# Patient Record
Sex: MALE | Race: WHITE | NOT HISPANIC OR LATINO | Employment: FULL TIME | URBAN - METROPOLITAN AREA
[De-identification: names, ages, dates, MRNs, and addresses within clinical notes are randomized per-mention and may not be internally consistent; named-entity substitution may affect disease eponyms.]

---

## 2019-01-17 ENCOUNTER — HOSPITAL ENCOUNTER (EMERGENCY)
Facility: HOSPITAL | Age: 34
Discharge: HOME | End: 2019-01-18
Attending: EMERGENCY MEDICINE | Admitting: EMERGENCY MEDICINE

## 2019-01-17 DIAGNOSIS — R00.2 PALPITATION: Primary | ICD-10-CM

## 2019-01-17 LAB
ALBUMIN SERPL-MCNC: 4.42 G/DL (ref 3.2–4.8)
ALBUMIN/GLOB SERPL: 1.5 G/DL (ref 1.5–2.5)
ALP SERPL-CCNC: 83 U/L (ref 25–100)
ALT SERPL W P-5'-P-CCNC: 46 U/L (ref 7–40)
ANION GAP SERPL CALCULATED.3IONS-SCNC: 10 MMOL/L (ref 3–11)
AST SERPL-CCNC: 29 U/L (ref 0–33)
BASOPHILS # BLD AUTO: 0.02 10*3/MM3 (ref 0–0.2)
BASOPHILS NFR BLD AUTO: 0.2 % (ref 0–1)
BILIRUB SERPL-MCNC: 1 MG/DL (ref 0.3–1.2)
BUN BLD-MCNC: 14 MG/DL (ref 9–23)
BUN/CREAT SERPL: 16.1 (ref 7–25)
CALCIUM SPEC-SCNC: 9 MG/DL (ref 8.7–10.4)
CHLORIDE SERPL-SCNC: 105 MMOL/L (ref 99–109)
CO2 SERPL-SCNC: 26 MMOL/L (ref 20–31)
CREAT BLD-MCNC: 0.87 MG/DL (ref 0.6–1.3)
DEPRECATED RDW RBC AUTO: 41.3 FL (ref 37–54)
EOSINOPHIL # BLD AUTO: 0.14 10*3/MM3 (ref 0–0.3)
EOSINOPHIL NFR BLD AUTO: 1.7 % (ref 0–3)
ERYTHROCYTE [DISTWIDTH] IN BLOOD BY AUTOMATED COUNT: 14 % (ref 11.3–14.5)
GFR SERPL CREATININE-BSD FRML MDRD: 100 ML/MIN/1.73
GLOBULIN UR ELPH-MCNC: 2.9 GM/DL
GLUCOSE BLD-MCNC: 87 MG/DL (ref 70–100)
HCT VFR BLD AUTO: 44.4 % (ref 38.9–50.9)
HGB BLD-MCNC: 15.1 G/DL (ref 13.1–17.5)
HOLD SPECIMEN: NORMAL
HOLD SPECIMEN: NORMAL
IMM GRANULOCYTES # BLD AUTO: 0.04 10*3/MM3 (ref 0–0.03)
IMM GRANULOCYTES NFR BLD AUTO: 0.5 % (ref 0–0.6)
LYMPHOCYTES # BLD AUTO: 2.6 10*3/MM3 (ref 0.6–4.8)
LYMPHOCYTES NFR BLD AUTO: 31.9 % (ref 24–44)
MCH RBC QN AUTO: 27.5 PG (ref 27–31)
MCHC RBC AUTO-ENTMCNC: 34 G/DL (ref 32–36)
MCV RBC AUTO: 80.9 FL (ref 80–99)
MONOCYTES # BLD AUTO: 0.52 10*3/MM3 (ref 0–1)
MONOCYTES NFR BLD AUTO: 6.4 % (ref 0–12)
NEUTROPHILS # BLD AUTO: 4.84 10*3/MM3 (ref 1.5–8.3)
NEUTROPHILS NFR BLD AUTO: 59.3 % (ref 41–71)
PLATELET # BLD AUTO: 236 10*3/MM3 (ref 150–450)
PMV BLD AUTO: 9 FL (ref 6–12)
POTASSIUM BLD-SCNC: 3.8 MMOL/L (ref 3.5–5.5)
PROT SERPL-MCNC: 7.3 G/DL (ref 5.7–8.2)
RBC # BLD AUTO: 5.49 10*6/MM3 (ref 4.2–5.76)
SODIUM BLD-SCNC: 141 MMOL/L (ref 132–146)
WBC NRBC COR # BLD: 8.16 10*3/MM3 (ref 3.5–10.8)
WHOLE BLOOD HOLD SPECIMEN: NORMAL
WHOLE BLOOD HOLD SPECIMEN: NORMAL

## 2019-01-17 PROCEDURE — 93005 ELECTROCARDIOGRAM TRACING: CPT | Performed by: EMERGENCY MEDICINE

## 2019-01-17 PROCEDURE — 99284 EMERGENCY DEPT VISIT MOD MDM: CPT

## 2019-01-17 PROCEDURE — 85025 COMPLETE CBC W/AUTO DIFF WBC: CPT | Performed by: EMERGENCY MEDICINE

## 2019-01-17 PROCEDURE — 93005 ELECTROCARDIOGRAM TRACING: CPT

## 2019-01-17 PROCEDURE — 80053 COMPREHEN METABOLIC PANEL: CPT | Performed by: EMERGENCY MEDICINE

## 2019-01-17 RX ORDER — SODIUM CHLORIDE 0.9 % (FLUSH) 0.9 %
10 SYRINGE (ML) INJECTION AS NEEDED
Status: DISCONTINUED | OUTPATIENT
Start: 2019-01-17 | End: 2019-01-18 | Stop reason: HOSPADM

## 2019-01-18 ENCOUNTER — OFFICE VISIT (OUTPATIENT)
Dept: CARDIOLOGY | Facility: HOSPITAL | Age: 34
End: 2019-01-18

## 2019-01-18 ENCOUNTER — HOSPITAL ENCOUNTER (OUTPATIENT)
Dept: CARDIOLOGY | Facility: HOSPITAL | Age: 34
Discharge: HOME | End: 2019-01-18
Admitting: NURSE PRACTITIONER

## 2019-01-18 VITALS
HEART RATE: 80 BPM | SYSTOLIC BLOOD PRESSURE: 151 MMHG | WEIGHT: 303 LBS | TEMPERATURE: 98.6 F | RESPIRATION RATE: 16 BRPM | HEIGHT: 71 IN | DIASTOLIC BLOOD PRESSURE: 91 MMHG | BODY MASS INDEX: 42.42 KG/M2 | OXYGEN SATURATION: 96 %

## 2019-01-18 VITALS
BODY MASS INDEX: 42.14 KG/M2 | HEART RATE: 85 BPM | SYSTOLIC BLOOD PRESSURE: 133 MMHG | HEIGHT: 71 IN | WEIGHT: 301 LBS | TEMPERATURE: 98.5 F | DIASTOLIC BLOOD PRESSURE: 70 MMHG | RESPIRATION RATE: 16 BRPM | OXYGEN SATURATION: 98 %

## 2019-01-18 DIAGNOSIS — R06.83 SNORING: ICD-10-CM

## 2019-01-18 DIAGNOSIS — R00.2 PALPITATIONS: Primary | ICD-10-CM

## 2019-01-18 DIAGNOSIS — R42 DIZZINESS: ICD-10-CM

## 2019-01-18 DIAGNOSIS — R53.83 FATIGUE, UNSPECIFIED TYPE: ICD-10-CM

## 2019-01-18 DIAGNOSIS — I49.1 PAC (PREMATURE ATRIAL CONTRACTION): ICD-10-CM

## 2019-01-18 DIAGNOSIS — R00.2 PALPITATIONS: ICD-10-CM

## 2019-01-18 PROCEDURE — 99214 OFFICE O/P EST MOD 30 MIN: CPT | Performed by: NURSE PRACTITIONER

## 2019-01-18 PROCEDURE — 93225 XTRNL ECG REC<48 HRS REC: CPT

## 2019-01-18 NOTE — DISCHARGE INSTRUCTIONS
Follow-up with Dr. Mian Lopez, cardiology for Holter monitor placement as soon as possible.     Return to the Emergency Department if new or worsening symptoms occur.

## 2019-01-18 NOTE — ED PROVIDER NOTES
[facility] EMERGENCY DEPARTMENT    eMERGENCY dEPARTMENT eNCOUnter      Pt Name: Grzegorz Gutierrez  MRN: [id number]  YOB: 1985  Date of evaluation: 1/17/2019  Provider: Andres Villar DO    CHIEF COMPLAINT       Chief Complaint   Patient presents with   • Rapid Heart Rate         HISTORY OF PRESENT ILLNESS  (Location/Symptom, Timing/Onset, Context/Setting, Quality, Duration, Modifying Factors, Severity.)   Grzegorz Gutierrez is a 33 y.o. male who presents to the emergency department With complaints of palpitations. Patient states that this onset 1/12 and he describes it as an intermittent “extra beat.” He notes that it worsens with rest but he does not detect it while working. His associated symptoms include chest pain which is described as tightness but he denies any nausea, vomiting, fever or chills. He reports that 1 week ago he visited an urgent treatment clinic for cough and congestion. He was prescribed Prednisone and Sudafed but stopped both medications on 1/13 after his symptoms arose.  Patient denies a history of smoking or heart issues. He notes that he does drink caffeine regularly but denies an excessive amount. He mentions he has had no recent travel.       Nursing notes were reviewed.    REVIEW OF SYSTEMS    (2-9 systems for level 4, 10 or more for level 5)   ROS:  General:  No fevers, no chills, no weakness  Cardiovascular:  + chest pain, + palpitations  Respiratory:  No shortness of breath, no cough, no wheezing  Gastrointestinal:  No pain, no nausea, no vomiting, no diarrhea  Musculoskeletal:  No muscle pain, no joint pain  Skin:  No rash, no easy bruising  Neurologic:  No speech problems, no headache, no extremity numbness, no extremity tingling, no extremity weakness  Psychiatric:  No anxiety  Genitourinary:  No dysuria, no hematuria    Except as noted above the remainder of the review of systems was reviewed and negative.       PAST MEDICAL HISTORY   History  reviewed. No pertinent past medical history.      SURGICAL HISTORY     History reviewed. No pertinent surgical history.      CURRENT MEDICATIONS     No current facility-administered medications for this encounter.     Current Outpatient Medications:   •  amoxicillin-clavulanate (AUGMENTIN) 250-125 MG per tablet, Take 1 tablet by mouth 3 (Three) Times a Day., Disp: , Rfl:     ALLERGIES     Patient has no known allergies.    FAMILY HISTORY       Family History   Problem Relation Age of Onset   • Hypertension Mother    • Hypertension Maternal Grandmother    • Transient ischemic attack Maternal Grandmother    • Heart attack Paternal Grandmother    • No Known Problems Father    • No Known Problems Sister    • No Known Problems Brother           SOCIAL HISTORY       Social History     Socioeconomic History   • Marital status:      Spouse name: Not on file   • Number of children: Not on file   • Years of education: Not on file   • Highest education level: Not on file   Tobacco Use   • Smoking status: Never Smoker   • Smokeless tobacco: Never Used   Substance and Sexual Activity   • Alcohol use: Yes     Frequency: Never     Comment: rare   • Drug use: No   • Sexual activity: Defer   Social History Narrative    Caffeine:0-1  serving per day. None this week.         PHYSICAL EXAM    (up to 7 for level 4, 8 or more for level 5)     Vitals:    01/17/19 2200 01/17/19 2230 01/17/19 2300 01/18/19 0000   BP: 146/77 128/83 136/82 151/91   Pulse: 71 73 67 80   Resp:       Temp:       SpO2: 96% 92% 94% 96%   Weight:       Height:           Physical Exam  General :Patient is awake, alert, oriented, in no acute distress, nontoxic appearing  HEENT: Pupils are equally round and reactive to light, EOMI, conjunctivae clear, sclerae white, there is no injection no icterus.  Oral mucosa is moist, no exudate. Uvula is midline  Neck: Neck is supple, full range of motion, trachea midline  Cardiac: Heart regular rate, rhythm, no murmurs,  rubs, or gallops  Lungs: Lungs are clear to auscultation, there is no wheezing, rhonchi, or rales. There is no use of accessory muscles.  Chest wall: There is no tenderness to palpation over the chest wall or over ribs  Abdomen: Abdomen is soft, nontender, nondistended. There is no firm or pulsatile masses, no rebound rigidity or guarding.   Musculoskeletal: 5 out of 5 strength in all 4 extremities.  No focal muscle deficits are appreciated  Neuro: Motor intact, sensory intact, level of consciousness is normal, cerebellar function is normal, reflexes are grossly normal. No evidence of incontinence or loss of bowel or bladder function, no saddle anesthesia noted   Dermatology: Skin is warm and dry  Psych: Mentation is grossly normal, cognition is grossly normal. Affect is appropriate.      DIAGNOSTIC RESULTS     EKG: All EKG's are interpreted by the Emergency Department Physician who either signs or Co-signs this chart in the absence of a cardiologist.    ECG 12 Lead   Final Result   Test Reason : high hr   Blood Pressure : **/** mmHG   Vent. Rate : 082 BPM     Atrial Rate : 082 BPM      P-R Int : 140 ms          QRS Dur : 090 ms       QT Int : 370 ms       P-R-T Axes : 066 012 035 degrees      QTc Int : 432 ms      Sinus rhythm with premature supraventricular complexes   Otherwise normal ECG   No previous ECGs available   Confirmed by ZULEYKA JOVEL MD ([id number]) on 1/17/2019 9:03:56 PM      Referred By:  EDMD           Confirmed By:ZULEYKA JOVEL MD      ECG 12 Lead    (Results Pending)       RADIOLOGY:   Non-plain film images such as CT, Ultrasound and MRI are read by the radiologist. Plain radiographic images are visualized and preliminarily interpreted by the emergency physician with the below findings:      [] Radiologist's Report Reviewed:  No orders to display         ED BEDSIDE ULTRASOUND:   Performed by ED Physician - none    LABS:    I have reviewed and interpreted all of the currently available lab results  from this visit (if applicable):  Results for orders placed or performed during the hospital encounter of 01/17/19   Comprehensive Metabolic Panel   Result Value Ref Range    Glucose 87 70 - 100 mg/dL    BUN 14 9 - 23 mg/dL    Creatinine 0.87 0.60 - 1.30 mg/dL    Sodium 141 132 - 146 mmol/L    Potassium 3.8 3.5 - 5.5 mmol/L    Chloride 105 99 - 109 mmol/L    CO2 26.0 20.0 - 31.0 mmol/L    Calcium 9.0 8.7 - 10.4 mg/dL    Total Protein 7.3 5.7 - 8.2 g/dL    Albumin 4.42 3.20 - 4.80 g/dL    ALT (SGPT) 46 (H) 7 - 40 U/L    AST (SGOT) 29 0 - 33 U/L    Alkaline Phosphatase 83 25 - 100 U/L    Total Bilirubin 1.0 0.3 - 1.2 mg/dL    eGFR Non African Amer 100 >60 mL/min/1.73    Globulin 2.9 gm/dL    A/G Ratio 1.5 1.5 - 2.5 g/dL    BUN/Creatinine Ratio 16.1 7.0 - 25.0    Anion Gap 10.0 3.0 - 11.0 mmol/L   CBC Auto Differential   Result Value Ref Range    WBC 8.16 3.50 - 10.80 10*3/mm3    RBC 5.49 4.20 - 5.76 10*6/mm3    Hemoglobin 15.1 13.1 - 17.5 g/dL    Hematocrit 44.4 38.9 - 50.9 %    MCV 80.9 80.0 - 99.0 fL    MCH 27.5 27.0 - 31.0 pg    MCHC 34.0 32.0 - 36.0 g/dL    RDW 14.0 11.3 - 14.5 %    RDW-SD 41.3 37.0 - 54.0 fl    MPV 9.0 6.0 - 12.0 fL    Platelets 236 150 - 450 10*3/mm3    Neutrophil % 59.3 41.0 - 71.0 %    Lymphocyte % 31.9 24.0 - 44.0 %    Monocyte % 6.4 0.0 - 12.0 %    Eosinophil % 1.7 0.0 - 3.0 %    Basophil % 0.2 0.0 - 1.0 %    Immature Grans % 0.5 0.0 - 0.6 %    Neutrophils, Absolute 4.84 1.50 - 8.30 10*3/mm3    Lymphocytes, Absolute 2.60 0.60 - 4.80 10*3/mm3    Monocytes, Absolute 0.52 0.00 - 1.00 10*3/mm3    Eosinophils, Absolute 0.14 0.00 - 0.30 10*3/mm3    Basophils, Absolute 0.02 0.00 - 0.20 10*3/mm3    Immature Grans, Absolute 0.04 (H) 0.00 - 0.03 10*3/mm3   Light Blue Top   Result Value Ref Range    Extra Tube hold for add-on    Green Top (Gel)   Result Value Ref Range    Extra Tube Hold for add-ons.    Lavender Top   Result Value Ref Range    Extra Tube hold for add-on    Gold Top - SST   Result Value  Ref Range    Extra Tube Hold for add-ons.         All other labs were within normal range or not returned as of this dictation.      EMERGENCY DEPARTMENT COURSE and DIFFERENTIAL DIAGNOSIS/MDM:   Vitals:    Vitals:    01/17/19 2200 01/17/19 2230 01/17/19 2300 01/18/19 0000   BP: 146/77 128/83 136/82 151/91   Pulse: 71 73 67 80   Resp:       Temp:       SpO2: 96% 92% 94% 96%   Weight:       Height:             Patient with intermittent palpitations over last 1 week.  Associated with the onset of Sudafed and prednisone for a recent respiratory infection.  He since stopped the prednisone, has had intermittent palpitations at rest.  No chest pain with exertion, shortness of breath, denies any history of underlying cardiac artery disease.  Does not appear acutely ill or toxic examination vital signs stable, heart rate 85, intermittent PAC noted, on the monitor the patient normal sinus without any arrhythmias.  Limited caffeine use, otherwise healthy.  We discussed obtaining IV, labs and electrolytes, results reviewed as above.  We discussed patient would benefit from cardiac evaluation and Holter monitor placement for further workup and evaluation.  We discussed limited caffeine use.  He denies any drugs abuse.  Blood work unremarkable.  We discussed the need for him to follow-up with our cardiology clinic for Holter monitor placement, further provocative testing is indicated.  If he has any worsening symptoms or further concerns he will return to the ED. The patient will follow-up with their PCP in 1-2 days for reevaluation.  If the patient or family members have any further concerns or patient has any worsening symptoms they will return to the ED for reevaluation.      MEDICATIONS ADMINISTERED IN ED:  Medications - No data to display    PROCEDURES:  Procedures    CRITICAL CARE TIME    Total Critical Care time was 0 minutes, excluding separately reportable procedures.   There was a high probability of clinically  significant/life threatening deterioration in the patient's condition which required my urgent intervention.      FINAL IMPRESSION      1. Palpitation          DISPOSITION/PLAN     ED Disposition     ED Disposition Condition Comment    Discharge Stable           PATIENT REFERRED TO:  Mian Lopez III, MD  [address] [facility] [address] [address]  [address]  [place] [address]  [phone]    Schedule an appointment as soon as possible for a visit   Follow-up for Holter monitor placement.    [facility] - HEART & VASC[facility]  [address]  [place] [id number] [phone]          DISCHARGE MEDICATIONS:     Medication List      You have not been prescribed any medications.     Documentation assistance provided by Santi Goodwin acting as scribe for Dr Villar.      The scribe's documentation has been prepared under my direction and personally reviewed by me in its entirety.  I confirm that the note above accurately reflects all work, treatment, procedures, and medical decision making performed by me    Comment: Please note this report has been produced using speech recognition software.    Andres Villar DO  Attending Emergency Physician                 Santi Goodwin  01/17/19 [id number]       Andres Villar,   01/17/19 [id number]       Andres Villar,   03/12/19 [id number]

## 2019-01-23 ENCOUNTER — APPOINTMENT (OUTPATIENT)
Dept: CARDIOLOGY | Facility: HOSPITAL | Age: 34
End: 2019-01-23

## 2019-01-28 PROCEDURE — 93227 XTRNL ECG REC<48 HR R&I: CPT | Performed by: INTERNAL MEDICINE

## 2019-02-09 ENCOUNTER — LAB (OUTPATIENT)
Dept: LAB | Facility: HOSPITAL | Age: 34
End: 2019-02-09

## 2019-02-09 DIAGNOSIS — R00.2 PALPITATIONS: ICD-10-CM

## 2019-02-09 LAB
CHOLEST SERPL-MCNC: 172 MG/DL (ref 0–199)
HDLC SERPL-MCNC: 31 MG/DL (ref 40–60)
LDLC SERPL CALC-MCNC: 114 MG/DL (ref 0–99)
LDLC/HDLC SERPL: 3.69 {RATIO}
TRIGL SERPL-MCNC: 133 MG/DL
TSH SERPL DL<=0.05 MIU/L-ACNC: 3.09 MIU/ML (ref 0.47–4.68)
VLDLC SERPL-MCNC: 26.6 MG/DL

## 2019-02-09 PROCEDURE — 80061 LIPID PANEL: CPT

## 2019-02-09 PROCEDURE — 36415 COLL VENOUS BLD VENIPUNCTURE: CPT

## 2019-02-09 PROCEDURE — 84443 ASSAY THYROID STIM HORMONE: CPT

## 2019-02-18 ENCOUNTER — OFFICE VISIT (OUTPATIENT)
Dept: CARDIOLOGY | Facility: HOSPITAL | Age: 34
End: 2019-02-18

## 2019-02-18 VITALS
TEMPERATURE: 97.6 F | RESPIRATION RATE: 18 BRPM | HEIGHT: 71 IN | BODY MASS INDEX: 42 KG/M2 | SYSTOLIC BLOOD PRESSURE: 142 MMHG | WEIGHT: 300 LBS | HEART RATE: 80 BPM | DIASTOLIC BLOOD PRESSURE: 74 MMHG

## 2019-02-18 DIAGNOSIS — R00.2 PALPITATIONS: Primary | ICD-10-CM

## 2019-02-18 DIAGNOSIS — E78.5 DYSLIPIDEMIA (HIGH LDL; LOW HDL): ICD-10-CM

## 2019-02-18 DIAGNOSIS — R03.0 ELEVATED BLOOD PRESSURE READING: ICD-10-CM

## 2019-02-18 DIAGNOSIS — R06.83 SNORING: ICD-10-CM

## 2019-02-18 DIAGNOSIS — I49.1 PAC (PREMATURE ATRIAL CONTRACTION): ICD-10-CM

## 2019-02-18 PROCEDURE — 99214 OFFICE O/P EST MOD 30 MIN: CPT | Performed by: NURSE PRACTITIONER

## 2019-02-18 NOTE — PROGRESS NOTES
[facility]  Heart [facility] Val[facility]      Encounter Date:02/18/2019     Grzegorz Gutierrez  [address] [facility] [address]  [unfilled]    1985    Provider, No Known    Grzegorz Gutierrez is a 33 y.o. male.      Subjective:     Chief Complaint:  Follow-up (palpitations)       HPI     33-year-old male presented to [facility] on 1/17/19 with complaints of palpitations.  Described as intermittent extra beats.  Worsened with rest.  Not noted with exertion.  Associated symptoms chest discomfort described as tightness and dizziness.  Occurred in the setting of upper respiratory symptoms with use of prednisone and Sudafed.  Typically drinks caffeine regularly.  PACs had been noted on earlier EKG.  Patient has a history of daytime fatigue, snoring, frequent wakening.  BMI 42.  No exertional chest pain, pressure, dyspnea.  Follow-up today on palpitations and recent heart monitor results.  She was also given a referral to sleep center for sleep evaluation.    Pt states s/s improved, except for recent illness with flu (increased palps).  No Chest pain, pressure, dyspnea, syncope, edema.      Scheduled for sleep study.  Pt did not have echo due expense.     Patient Active Problem List    Diagnosis Date Noted   • Palpitation 01/18/2019     Note Last Updated: 2/4/2019     · 48 hour Holter monitor 1/18/19: Patient events associated with sinus rhythm, occasional PACs less than 1%.           History reviewed. No pertinent surgical history.    No Known Allergies    No current outpatient medications on file.    The following portions of the patient's history were reviewed and updated as appropriate: allergies, current medications, past family history, past medical history, past social history, past surgical history and problem list.    Review of Systems   Cardiovascular: Positive for palpitations.   All other systems reviewed and are negative.      Objective:     Vitals:    02/18/19 0946   BP: 142/74  "  BP Location: Left arm   Patient Position: Sitting   Pulse: 80   Resp: 18   Temp: 97.6 °F (36.4 °C)   TempSrc: Temporal   Weight: 136 kg (300 lb)   Height: 180.3 cm (71\")         Physical Exam   Constitutional: He is oriented to person, place, and time. He appears well-developed and well-nourished. No distress.   HENT:   Head: Normocephalic and atraumatic.   Mouth/Throat: Oropharynx is clear and moist.   Eyes: Conjunctivae are normal. Pupils are equal, round, and reactive to light. No scleral icterus.   Neck: No hepatojugular reflux and no JVD present. Carotid bruit is not present. No tracheal deviation present. No thyromegaly present.   Cardiovascular: Normal rate, regular rhythm, normal heart sounds and intact distal pulses. Exam reveals no friction rub.   No murmur heard.  Pulmonary/Chest: Effort normal and breath sounds normal.   Abdominal: Soft. Bowel sounds are normal. He exhibits no distension. There is no tenderness.   Musculoskeletal: He exhibits no edema.   Lymphadenopathy:     He has no cervical adenopathy.   Neurological: He is alert and oriented to person, place, and time.   Skin: Skin is warm, dry and intact. No rash noted. No cyanosis or erythema. No pallor.   Psychiatric: He has a normal mood and affect. His behavior is normal. Thought content normal.   Vitals reviewed.      Lab and Diagnostic Review:  Lab Results   Component Value Date    CHOL 172 02/09/2019     Lab Results   Component Value Date    TRIG 133 02/09/2019     Lab Results   Component Value Date    HDL 31 (L) 02/09/2019     Lab Results   Component Value Date     (H) 02/09/2019     Lab Results   Component Value Date    WBC 8.16 01/17/2019    HGB 15.1 01/17/2019    HCT 44.4 01/17/2019    MCV 80.9 01/17/2019     01/17/2019     Lab Results   Component Value Date    GLUCOSE 87 01/17/2019    BUN 14 01/17/2019    CREATININE 0.87 01/17/2019    EGFRIFNONA 100 01/17/2019    BCR 16.1 01/17/2019    K 3.8 01/17/2019    CO2 26.0 " 01/17/2019    CALCIUM 9.0 01/17/2019    ALBUMIN 4.42 01/17/2019    AST 29 01/17/2019    ALT 46 (H) 01/17/2019     Lab Results   Component Value Date    TSH 3.090 02/09/2019       Assessment and Plan:         1. Palpitations  improved  Continue to not drink caffeine    2. PAC (premature atrial contraction)  <1%    3. BMI 40.0-44.9, adult (CMS/HCC)  Discussed diet and exercise modifications for weight loss    4. Elevated blood pressure reading  Diet and exercise.  If still elevated in 3 months will consider Diltiazem    5. Dyslipidemia (high LDL; low HDL)  Diet and exercise    6. Snoring  F/u with sleep study as scheduled    F/u 3 months or sooner if needed.         *Please note that portions of this note were completed with a voice recognition program. Efforts were made to edit the dictations, but occasionally words are mistranscribed.

## 2019-03-01 ENCOUNTER — CONSULT (OUTPATIENT)
Dept: SLEEP MEDICINE | Facility: HOSPITAL | Age: 34
End: 2019-03-01

## 2019-03-01 VITALS
HEIGHT: 71 IN | OXYGEN SATURATION: 97 % | SYSTOLIC BLOOD PRESSURE: 142 MMHG | HEART RATE: 88 BPM | BODY MASS INDEX: 42.17 KG/M2 | WEIGHT: 301.2 LBS | DIASTOLIC BLOOD PRESSURE: 70 MMHG

## 2019-03-01 DIAGNOSIS — G47.33 OBSTRUCTIVE SLEEP APNEA, ADULT: ICD-10-CM

## 2019-03-01 DIAGNOSIS — E66.01 MORBID OBESITY (HCC): ICD-10-CM

## 2019-03-01 DIAGNOSIS — R06.83 SNORING: Primary | ICD-10-CM

## 2019-03-01 PROCEDURE — 99203 OFFICE O/P NEW LOW 30 MIN: CPT | Performed by: INTERNAL MEDICINE

## 2019-03-01 RX ORDER — AMOXICILLIN AND CLAVULANATE POTASSIUM 250; 125 MG/1; MG/1
1 TABLET, FILM COATED ORAL 3 TIMES DAILY
COMMUNITY
End: 2019-04-02

## 2019-03-01 NOTE — PROGRESS NOTES
Subjective   Grzegorz Gutierrez is a 33 y.o. male is being seen for consultation today at the request of MESSI Fitzgerald for the evaluation of snoring and possible sleep disordered breathing.    History of Present Illness  In January the patient was having some episodes of palpitations and was seen by Ms. Larose.  He gave a history of snoring and possible sleep disordered breathing and is referred here.  He says he has had snoring noted for about a year.  He has had occasional apneas noted.  He denies awakening gasping for breath.  He says he usually feels rested in the morning.  He denies morning headaches.  He has been awakened at night with reflux.  He will sometimes fall asleep if sitting quiet during the day and may often fall asleep from the TV at night.  He denies any problems while driving.    He has history of loud snoring snores in all positions but is worse on his back.  He has awakened with a dry mouth and has been told he stops breathing.  He is waking up coughing.  He has sometimes trouble breathing through his nose.  He has been a mouth breather all of his life he says.  He denies ever breaking his nose.  He has occasional reflux but is not on medications.  He denies hypnagogic hallucinations sleep paralysis.  He has occasional clicking of his legs but denies that it keeps him awake.  He denies significant back pain.  His weight has increased 25 pounds this year.    He goes to bed between 10 PM and 11 PM.  He will fall asleep in 10-20 minutes.  He awakens once or twice during the night.  He thinks he gets 4-6 hours of sleep arising at 4 AM.  He still feels tired at times.  He denies a history of hypertension diabetes coronary artery disease.  He has been found to have some palpitations.  No Known Allergies       Current Outpatient Medications:   •  amoxicillin-clavulanate (AUGMENTIN) 250-125 MG per tablet, Take 1 tablet by mouth 3 (Three) Times a Day., Disp: , Rfl:     Social History    Tobacco  "Use      Smoking status: Never Smoker      Smokeless tobacco: Never Used       Social History     Substance and Sexual Activity   Alcohol Use Yes   • Frequency:  He estimates 1 drink per month    Comment: rare       Caffeine: He is cut back to one decaf tea and one decaf cola per day    History reviewed. No pertinent past medical history.    History reviewed. No pertinent surgical history.    Family History   Problem Relation Age of Onset   • Hypertension Mother    • Hypertension Maternal Grandmother    • Transient ischemic attack Maternal Grandmother    • Heart attack Paternal Grandmother    • No Known Problems Father    • No Known Problems Sister    • No Known Problems Brother        The following portions of the patient's history were reviewed and updated as appropriate: allergies, current medications, past family history, past medical history, past social history, past surgical history and problem list.    Review of Systems   Constitutional: Negative.    HENT: Positive for ear discharge, ear pain and postnasal drip.    Eyes: Negative.    Respiratory: Negative.    Cardiovascular: Positive for palpitations.   Gastrointestinal:        He complains of frequent heartburn   Endocrine: Negative.    Genitourinary: Negative.    Musculoskeletal: Negative.    Skin: Negative.    Allergic/Immunologic: Negative.    Neurological: Negative.    Hematological: Negative.    Psychiatric/Behavioral: Negative.    Denver score 7/24    Objective     /70   Pulse 88   Ht 180.3 cm (71\")   Wt (!) 137 kg (301 lb 3.2 oz)   SpO2 97%   BMI 42.01 kg/m²      Physical Exam   Constitutional: He is oriented to person, place, and time. He appears well-developed and well-nourished.   He has morbid obesity.   HENT:   Head: Normocephalic and atraumatic.   He has nasal airway narrowing and Mallampati class IV anatomy.   Eyes: EOM are normal. Pupils are equal, round, and reactive to light.   Neck: Normal range of motion. Neck supple. "   Cardiovascular: Normal rate, regular rhythm and normal heart sounds.   Pulmonary/Chest: Effort normal and breath sounds normal.   Abdominal: Soft. Bowel sounds are normal.   Musculoskeletal: Normal range of motion. He exhibits no edema.   Neurological: He is alert and oriented to person, place, and time.   Skin: Skin is warm and dry.   Psychiatric: He has a normal mood and affect. His behavior is normal.         Assessment/Plan   Grzegorz was seen today for sleeping problem.    Diagnoses and all orders for this visit:    Snoring  -     Home Sleep Study; Future    Obstructive sleep apnea, adult  -     Home Sleep Study; Future    Morbid obesity (CMS/[facility])  -     Ambulatory Referral to Nutrition Services    Patient has a history of snoring and nonrestorative sleep.  He gets an excellent story for obstructive sleep apnea.  We will plan to proceed to home sleep testing.  I discussed possible therapies including CPAP, weight control, oral appliance, and surgery.  Also discussed long-term consequences of untreated obstructive sleep apnea.  He is encouraged to lose weight.  He is willing to be referred to nutrition services.  He is encouraged to avoid alcohol and sedatives close to bedtime.  He is encouraged to practice lateral position sleep.  We will see him back after his study.         Mina Nielsen MD [facility]  Sleep Medicine  Pulmonary and Critical Care Medicine

## 2019-03-14 ENCOUNTER — APPOINTMENT (OUTPATIENT)
Dept: SLEEP MEDICINE | Facility: HOSPITAL | Age: 34
End: 2019-03-14

## 2019-03-25 ENCOUNTER — HOSPITAL ENCOUNTER (OUTPATIENT)
Dept: SLEEP MEDICINE | Facility: HOSPITAL | Age: 34
Discharge: HOME | End: 2019-03-25
Admitting: INTERNAL MEDICINE

## 2019-03-25 VITALS
WEIGHT: 306.8 LBS | DIASTOLIC BLOOD PRESSURE: 62 MMHG | SYSTOLIC BLOOD PRESSURE: 121 MMHG | HEIGHT: 71 IN | BODY MASS INDEX: 42.95 KG/M2 | HEART RATE: 73 BPM | OXYGEN SATURATION: 98 %

## 2019-03-25 DIAGNOSIS — R06.83 SNORING: ICD-10-CM

## 2019-03-25 DIAGNOSIS — G47.33 OBSTRUCTIVE SLEEP APNEA, ADULT: ICD-10-CM

## 2019-03-25 PROCEDURE — 95806 SLEEP STUDY UNATT&RESP EFFT: CPT

## 2019-03-25 PROCEDURE — 95806 SLEEP STUDY UNATT&RESP EFFT: CPT | Performed by: INTERNAL MEDICINE

## 2019-03-27 DIAGNOSIS — G47.33 OBSTRUCTIVE SLEEP APNEA, ADULT: Primary | ICD-10-CM

## 2019-03-27 DIAGNOSIS — R06.83 SNORING: ICD-10-CM

## 2019-04-02 ENCOUNTER — OFFICE VISIT (OUTPATIENT)
Dept: SLEEP MEDICINE | Facility: HOSPITAL | Age: 34
End: 2019-04-02

## 2019-04-02 VITALS
HEIGHT: 71 IN | HEART RATE: 82 BPM | WEIGHT: 306.2 LBS | DIASTOLIC BLOOD PRESSURE: 68 MMHG | SYSTOLIC BLOOD PRESSURE: 122 MMHG | BODY MASS INDEX: 42.87 KG/M2 | OXYGEN SATURATION: 98 %

## 2019-04-02 DIAGNOSIS — G47.33 OSA (OBSTRUCTIVE SLEEP APNEA): Primary | ICD-10-CM

## 2019-04-02 PROCEDURE — 99213 OFFICE O/P EST LOW 20 MIN: CPT | Performed by: NURSE PRACTITIONER

## 2019-04-02 NOTE — PROGRESS NOTES
"Subjective: Follow-up        Chief Complaint:   Chief Complaint   Patient presents with   • Follow-up       HPI:    Grzegorz Gutierrez is a 33 y.o. male here for follow-up of sleep study results.  Patient was seen here in consult 3/1/2019 with Dr. Mian Nielsen.  Patient had a recent history of palpitations, snoring, and occasional witnessed apneas.  Patient sleeps 4-6 hours nightly and\" feels slightly groggy upon awakening mostly I feel good after getting out of bed.\"  Patient has an [place] score of 7/24.  She had a sleep study on 3/25/2019 that showed moderate obstructive sleep apnea.  Patient understands the consequences of untreated sleep apnea and wishes to initiate CPAP therapy.      Current medications are: No current outpatient medications on file..      The patient's relevant past medical, surgical, family and social history were reviewed and updated in Epic as appropriate.       Review of Systems   Respiratory: Positive for apnea.    Cardiovascular: Positive for palpitations.   Gastrointestinal:        Reflux   Psychiatric/Behavioral: Positive for sleep disturbance.   All other systems reviewed and are negative.        Objective:    Physical Exam   Constitutional: He is oriented to person, place, and time. He appears well-developed and well-nourished.   Morbidly obese male   HENT:   Head: Normocephalic and atraumatic.   Mouth/Throat: Oropharynx is clear and moist.   Mallampati 4 anatomy   Eyes: Conjunctivae are normal.   Neck: Neck supple. No thyromegaly present.   Cardiovascular: Normal rate and regular rhythm.   Pulmonary/Chest: Effort normal and breath sounds normal.   Lymphadenopathy:     He has no cervical adenopathy.   Neurological: He is alert and oriented to person, place, and time.   Skin: Skin is warm and dry.   Psychiatric: He has a normal mood and affect. His behavior is normal. Judgment and thought content normal.   Nursing note and vitals reviewed.        ASSESSMENT/PLAN    Grzegorz was seen " today for follow-up.    Diagnoses and all orders for this visit:    REGINO (obstructive sleep apnea)            1. Counseled patient regarding multimodal approach with healthy nutrition, healthy sleep, regular physical activity, social activities, counseling, and medications. Encouraged to practice lateral sleep position. Avoid alcohol and sedatives close to bedtime.  2. Ordered to be faxed to initiate CPAP therapy at DME of patient's choosing.  I will see back in 31-90 days to reassess.    I have reviewed the results of my evaluation and impression and discussed my recommendations in detail with the patient.      Signed by  MESSI Garcia    April 2, 2019      CC: Provider, No Known          No ref. provider found

## 2019-04-02 NOTE — PATIENT INSTRUCTIONS

## 2019-04-04 ENCOUNTER — HOSPITAL ENCOUNTER (OUTPATIENT)
Dept: NUTRITION | Facility: HOSPITAL | Age: 34
Discharge: HOME | End: 2019-04-04
Admitting: INTERNAL MEDICINE

## 2019-04-04 VITALS — BODY MASS INDEX: 42.84 KG/M2 | WEIGHT: 306 LBS | HEIGHT: 71 IN

## 2019-04-04 PROCEDURE — 97802 MEDICAL NUTRITION INDIV IN: CPT

## 2019-04-04 NOTE — PROGRESS NOTES
"Adult Outpatient Nutrition  Assessment/PES    Patient Name:  Grzegorz Gutierrez  YOB: 1985  MRN: [id number]    Assessment Date:  4/4/2019    Comments:   Mr. Gutierrez was seen today to discuss  weight management as it pertains to nutrition. He states he started having heart palpitations in the past and was sent to a cardiologist and then referred for weight management diet education. He states his cholesterol is also high and wants to know how to get that down too.    Wt. 306 lb.   Ht. 71\"   BMI  42.68    Estimated daily needs:   8251-3425 Kcal   62-78 gm Protein     RD reviewed 24 hour recall and discussed dietary questions and changes suggested. RD reviewed the healthy plate method. RD also reviewed which foods are considered carbohydrates and recommended carbohydrate intake per meal and snack with sample meal plan provided. RD also provided a list of various portion sizes of carbohydrate servings from different food groups.    RD also reviewed how to read the nutrition label and matched this to the sample meal plan and how to figure out what will work for snacks. RD also provided a sample snack list. RD stressed the importance of cutting fat back in the diet.     RD also stressed the importance of exercise and explained how losing weight helps to decrease insulin resistance. RD also suggested strength training 2 days per week and cardio 3 days per week.    RD also provided a diet to lower cholesterol with recommendations of foods to add and those to avoid. RD discussed the need to eat more lean meats and to add fiber rich foods daily.     Pt. Set goals for next visit:  1.) Watch food portions of carbohydrate foods at meals (2-3 carbohydrate servings per meal) and snacks (15-30 gm total carbohydrate) 6 days per week.    2.) Get more exercise by walking, kettlebell training and body weight exercises five days per week for 30 minutes per day.   3.) Lose weight (0.5-1.0 lb per week recommended). Self " "set wt. Goal for next visit 300 lb.      Mr. Gutierrez set follow up appointment for 05/30/19 at 3:30 p.m. Thank you for the initial referral to diabetes/nutrition counseling. It was my pleasure to see Mr. Gutierrez.          General Info     Row Name 04/04/19 1111       Today's Session    Person(s) attending today's session  Patient     Services Used Today?  No       General Information    How Well Do You Speak English?  very well    Lives With  spouse;child(radha), dependent    Last grade of school completed  12        Physical Findings     Row Name 04/04/19 1116          Physical Findings    Overall Physical Appearance  obese         Anthropometrics     Row Name 04/04/19 1116          Anthropometrics    Height  180.3 cm (71\")     Weight  139 kg (306 lb)  (Abnormal)         Ideal Body Weight (IBW)    Ideal Body Weight (IBW) (kg)  79.27     % Ideal Body Weight  175.09        Body Mass Index (BMI)    BMI (kg/m2)  42.77        IBW Adjustment, Para/Tetraplegia    5% Adjustment, Para (IBW)  75.31     10% Adjustment, Para (IBW)  71.34     10% Adjustment, Tetra (IBW)  71.34     15% Adjustment, Tetra (IBW)  67.38         Nutritional Info/Activity     Row Name 04/04/19 1117       Nutritional Information    Have you had weight changes?  Yes    Describe weight changes  lost 10 lbs but have gained some back    What is your desired body weight?  113 kg (250 lb)    Have you tried to lose weight before?  Yes    List programs tried, date, and success  diet and exercise,  and lost 30 lbs.    What is your motivation to lose weight?  heart issues and family    History of eating disorder?  No    Do you have difficulty chewing food?  No    Functional Status  able to prepare meals    List any food cravings/trigger foods you have  sweets    How often during the day do you find yourself snacking?  1-2    Food Behaviors  Boredom eater    How often do you eat out and where?  fast food and eat in 2-3 times per week    " "Do you use Food Assistance programs (WIC, food stamps, food bank)?  no    Do you need information about Food Assistance programs?  no    How many times do you drink milk per day?  0    How many times do you eat fruit per day?  0    How many times do you eat vegetables per day?  2    How many times do you drink juice per day?  0    How many times do you eat candy/chocolates per day?  1    How many times do you eat baked goods per day?  0    How many times do you eat desserts per day?  0    How many times do you eat ice cream per day?  0    How many times do you eat snack foods per day?  3    How many diet sodas do you drink per day?  0    How many regular sodas do you drink per day?  0    How many times do you eat ethnic food per day?  0    How many times do you drink alcohol per day?  0    How many times do you have caffeine per day?  0    How many servings of artificial sweetner do you have per day?  -- 1-2    How many meals do you eat each day?  -- 2-3    How many snacks do you eat each day?  2    What is the biggest challenge you have with your diet?  Other (comment) snack foods and sticking to the plan    What type of support do you currently use to help you with your health issues?  wife for walking and weight loss together    Enter everything you can remember eating in the last 24 hours (1 day)  cereal, pastry, sandwich and chips, cheeseburger or steak or chicken with rice and vegetables or sweet potato fries.       Eating Environment    Eating environment  Family;Work       Physical Activity    Are you currently involved in an activity/exercise program?   No    Reasons for Inactivity  Other (comment) just not active much outside of work currently    How would you rank exercise as an important health lifestyle practice?  8          Estimated/Assessed Needs     Row Name 04/04/19 1126 04/04/19 1116       Calculation Measurements    Height  --  180.3 cm (71\")       Estimated/Assessed Needs    Additional " "Documentation  Protein Requirements (Group);Calorie Requirements (Group)  --       Calorie Requirements    Estimated Calorie Requirement Comment  [id number]  --       Protein Requirements    Est Protein Requirement Amount (gms/kg)  -- 62-78 gm/day  --        Evaluation of Prescribed Nutrient/Fluid Intake     Row Name 04/04/19 1116          Calculation Measurements    Height  180.3 cm (71\")               Problem/Interventions:  Problem 1     Row Name 04/04/19 1128          Nutrition Diagnoses Problem 1    Problem 1  Overweight/Obesity     Etiology (related to)  Factors Affecting Nutrition     Food Habit/Preferences  Large Meals     Signs/Symptoms (evidenced by)  BMI     BMI  Greater than 40                 Intervention Goal     Row Name 04/04/19 1146          Intervention Goal    General  Meet nutritional needs for age/condition     PO  Meet estimated needs     Weight  Appropriate weight loss           Nutrition Prescription     Row Name 04/04/19 1146          Nutrition Prescription PO    PO Prescription  Begin/change diet     Common Modifiers  Low Fat;Consistent Carbohydrate     Other Modifiers  Low fiber     New PO Prescription Ordered?  Yes         Education/Evaluation     Row Name 04/04/19 1146          Education    Education  Provided education regarding;Education topics     Education Topics  Basic nutrition;Cardiac heart health;Caffeine;Calorie counting;CHO counting;Cholesterol;Fiber;Fat;Fluid;Gradual weight loss;Low fat;Milk;Na+;Protein     Fat (gm/day)  55 gm/day        Monitor/Evaluation    Monitor  Per protocol;PO intake;Weight;Food/activity diary           Electronically signed by:  Oneyda Gonzalez RD  04/04/19 11:51 AM  "

## 2019-05-30 ENCOUNTER — APPOINTMENT (OUTPATIENT)
Dept: NUTRITION | Facility: HOSPITAL | Age: 34
End: 2019-05-30

## 2019-06-13 ENCOUNTER — OFFICE VISIT (OUTPATIENT)
Dept: CARDIOLOGY | Facility: HOSPITAL | Age: 34
End: 2019-06-13

## 2019-06-13 ENCOUNTER — OFFICE VISIT (OUTPATIENT)
Dept: SLEEP MEDICINE | Facility: HOSPITAL | Age: 34
End: 2019-06-13

## 2019-06-13 VITALS
WEIGHT: 308 LBS | HEART RATE: 72 BPM | OXYGEN SATURATION: 98 % | RESPIRATION RATE: 18 BRPM | DIASTOLIC BLOOD PRESSURE: 64 MMHG | BODY MASS INDEX: 43.12 KG/M2 | TEMPERATURE: 97.2 F | HEIGHT: 71 IN | SYSTOLIC BLOOD PRESSURE: 136 MMHG

## 2019-06-13 VITALS
OXYGEN SATURATION: 98 % | HEART RATE: 81 BPM | SYSTOLIC BLOOD PRESSURE: 129 MMHG | DIASTOLIC BLOOD PRESSURE: 69 MMHG | WEIGHT: 308.6 LBS | BODY MASS INDEX: 43.2 KG/M2 | HEIGHT: 71 IN

## 2019-06-13 DIAGNOSIS — R00.2 PALPITATION: Primary | ICD-10-CM

## 2019-06-13 DIAGNOSIS — E78.5 DYSLIPIDEMIA (HIGH LDL; LOW HDL): ICD-10-CM

## 2019-06-13 DIAGNOSIS — I49.1 PAC (PREMATURE ATRIAL CONTRACTION): ICD-10-CM

## 2019-06-13 DIAGNOSIS — R03.0 ELEVATED BLOOD PRESSURE READING: ICD-10-CM

## 2019-06-13 DIAGNOSIS — E66.01 MORBID OBESITY (HCC): ICD-10-CM

## 2019-06-13 DIAGNOSIS — G47.33 OSA (OBSTRUCTIVE SLEEP APNEA): Primary | ICD-10-CM

## 2019-06-13 DIAGNOSIS — G47.33 OSA (OBSTRUCTIVE SLEEP APNEA): ICD-10-CM

## 2019-06-13 PROCEDURE — 99213 OFFICE O/P EST LOW 20 MIN: CPT | Performed by: NURSE PRACTITIONER

## 2019-06-13 NOTE — PROGRESS NOTES
Subjective: Follow-up        Chief Complaint:   Chief Complaint   Patient presents with   • Follow-up       HPI:    Grzegorz Gutierrez is a 33 y.o. male here for follow-up of obstructive sleep apnea.  Patient was originally having palpitations, snoring, witnessed apneas, and feeling foggy headed throughout the day.  Patient had a sleep study 3/25/2019 that showed moderate obstructive sleep apnea.  In April  On 4/2/2019 patient did decide to initiate CPAP therapy.  Patient states he is doing well with CPAP.  He is sleeping 4 to 5 hours nightly and feels refreshed upon awakening.  Patient reports he is no longer getting sleepy in the early evening.  Patient has an [place] score of 2/24.  Patient wishes to continue CPAP therapy.    Current medications are: No current outpatient medications on file..      The patient's relevant past medical, surgical, family and social history were reviewed and updated in Epic as appropriate.       Review of Systems   Cardiovascular: Positive for palpitations.   Gastrointestinal:        Heartburn   Psychiatric/Behavioral: Positive for sleep disturbance.   All other systems reviewed and are negative.        Objective:    Physical Exam   Constitutional: He is oriented to person, place, and time. He appears well-developed and well-nourished.   HENT:   Head: Normocephalic and atraumatic.   Mouth/Throat: Oropharynx is clear and moist.   Mallampati 4 anatomy   Eyes: Conjunctivae are normal.   Neck: Neck supple.   Cardiovascular: Normal rate and regular rhythm.   Pulmonary/Chest: Effort normal and breath sounds normal.   Neurological: He is alert and oriented to person, place, and time.   Skin: Skin is warm and dry.   Psychiatric: He has a normal mood and affect. His behavior is normal. Judgment and thought content normal.   Nursing note and vitals reviewed.  54/50 9 days of use.  Greater than 4-hour use 61%.  9% pressure 9.5.  AHI 2.2.  Download reviewed with  patient.      ASSESSMENT/PLAN    Grzegorz was seen today for follow-up.    Diagnoses and all orders for this visit:    REGINO (obstructive sleep apnea)  -     CPAP Therapy            1. Counseled patient regarding multimodal approach with healthy nutrition, healthy sleep, regular physical activity, social activities, counseling, and medications. Encouraged to practice lateral sleep position. Avoid alcohol and sedatives close to bedtime.  2.   Increase CPAP usage.  Return to clinic 1 year or sooner if symptoms warrant.  I have reviewed the results of my evaluation and impression and discussed my recommendations in detail with the patient.      Signed by  MESSI Garcia    June 13, 2019      CC: Provider, No Known          No ref. provider found

## 2019-06-13 NOTE — PROGRESS NOTES
"[facility]      Encounter Date:06/13/2019     Grzegorz Gutierrez  [address] [facility] [address]  [unfilled]    1985    Provider, No Known    Grzegorz Gutierrez is a 33 y.o. male.      Subjective:     Chief Complaint:  Palpitations and Follow-up       HPI     33-year-old male follow-up on palpitations with history of PVCs on Holter monitor of less than 1% in January 2019.  Palpitations intermittent.  Worse at rest. Palpitations are rare and mild.  Denies CP, pressure, dizziness, syncope. Edema. Patient with history of obstructive sleep apnea. Has been on  Cpap for 1 month with less fatigue.  BMI greater than 40.  Lifestyle modifications included decreasing caffeine intake, diet and exercise.  Pt has met with dietitian and he is working to make changes.  Patient with dyslipidemia. Echo had been discussed in the past.  Patient deferred due to expense.    No problems updated.    History reviewed. No pertinent surgical history.    No Known Allergies    No current outpatient medications on file.    The following portions of the patient's history were reviewed and updated today during office visit as appropriate: allergies, current medications, past family history, past medical history, past social history, past surgical history and problem list.    Review of Systems   Cardiovascular: Positive for palpitations.   All other systems reviewed and are negative.      Objective:     Vitals:    06/13/19 0933   BP: 136/64   BP Location: Right arm   Patient Position: Sitting   Cuff Size: Large Adult   Pulse: 72   Resp: 18   Temp: 97.2 °F (36.2 °C)   TempSrc: Temporal   SpO2: 98%   Weight: (!) 140 kg (308 lb)   Height: 180.3 cm (71\")         Physical Exam   Constitutional: He is oriented to person, place, and time. He appears well-developed and well-nourished. No distress.   Eyes: No scleral icterus.   Neck: No hepatojugular reflux and no JVD present. Carotid bruit is not present. "   Cardiovascular: Normal rate, regular rhythm, normal heart sounds and intact distal pulses. Exam reveals no friction rub.   No murmur heard.  Pulmonary/Chest: Effort normal and breath sounds normal.   Abdominal: Soft. He exhibits no distension. There is no tenderness.   Musculoskeletal: He exhibits no edema.   Neurological: He is alert and oriented to person, place, and time.   Skin: Skin is warm, dry and intact. No rash noted. No cyanosis or erythema. No pallor.   Psychiatric: He has a normal mood and affect. His behavior is normal. Thought content normal.   Vitals reviewed.      Lab and Diagnostic Review:  Lab Results   Component Value Date    WBC 8.16 01/17/2019    HGB 15.1 01/17/2019    HCT 44.4 01/17/2019    MCV 80.9 01/17/2019     01/17/2019     Lab Results   Component Value Date    GLUCOSE 87 01/17/2019    BUN 14 01/17/2019    CREATININE 0.87 01/17/2019    EGFRIFNONA 100 01/17/2019    BCR 16.1 01/17/2019    K 3.8 01/17/2019    CO2 26.0 01/17/2019    CALCIUM 9.0 01/17/2019    ALBUMIN 4.42 01/17/2019    AST 29 01/17/2019    ALT 46 (H) 01/17/2019     Lab Results   Component Value Date    TSH 3.090 02/09/2019     Lab Results   Component Value Date    CHOL 172 02/09/2019     Lab Results   Component Value Date    TRIG 133 02/09/2019     Lab Results   Component Value Date    HDL 31 (L) 02/09/2019     Lab Results   Component Value Date     (H) 02/09/2019       Assessment and Plan:         1. Palpitation  Rare. No concerns or associated s/s      2. PAC (premature atrial contraction)  Stable  Continue to monitor.  Avoid caffeine and stimulants    3. Morbid obesity (CMS/[facility])  BMI 43  Currently working with dietitian  Encouraged diet modificaitons and exercise for weight loss    4. Dyslipidemia (high LDL; low HDL)  Diet and exercise    5. Elevated blood pressure reading  Diet and exercise    6. REGINO (obstructive sleep apnea)  Encouraged compliance with CPap    Encouraged to establish with PCP. Offered call  center referral.  Declined    F/u [facility] as needed.     *Please note that portions of this note were completed with a voice recognition program. Efforts were made to edit the dictations, but occasionally words are mistranscribed.

## 2019-08-02 ENCOUNTER — DOCUMENTATION (OUTPATIENT)
Dept: NUTRITION | Facility: HOSPITAL | Age: 34
End: 2019-08-02

## 2019-08-02 NOTE — PROGRESS NOTES
Nutrition Services    Patient Name:  Grzegorz Gutierrez  YOB: 1985  MRN: [id number]  Admit Date:  (Not on file)    Pt was mailed follow-up letter on 8/2/19.    Electronically signed by:  Mireya Escoto RD  08/02/19 4:24 PM

## 2019-09-10 ENCOUNTER — DOCUMENTATION (OUTPATIENT)
Dept: NUTRITION | Facility: HOSPITAL | Age: 34
End: 2019-09-10

## 2019-09-10 NOTE — PROGRESS NOTES
Nutrition Services    Patient Name:  Grzegorz Gutierrez  YOB: 1985  MRN: [id number]  Admit Date:  (Not on file)    [facility] sent follow up letter on 8/1/2019 with no response from pt. KEV to close out chart and notify referring provider at this time. I sincerely would like to thank you for this consult.      Electronically signed by:  Rosina Camp RD  09/10/19 4:17 PM

## 2020-06-11 ENCOUNTER — TELEMEDICINE (OUTPATIENT)
Dept: SLEEP MEDICINE | Facility: HOSPITAL | Age: 35
End: 2020-06-11

## 2020-06-11 VITALS — HEIGHT: 71 IN | BODY MASS INDEX: 43.4 KG/M2 | WEIGHT: 310 LBS

## 2020-06-11 DIAGNOSIS — G47.33 OSA (OBSTRUCTIVE SLEEP APNEA): Primary | ICD-10-CM

## 2020-06-11 PROCEDURE — 99212 OFFICE O/P EST SF 10 MIN: CPT | Performed by: NURSE PRACTITIONER

## 2020-06-11 NOTE — PROGRESS NOTES
Chief Complaint:   Chief Complaint   Patient presents with   • Follow-up       HPI:    Grzegorz Gutierrez is a 34 y.o. male here for follow-up of sleep apnea.  Patient states he is doing well with CPAP therapy.  Patient is sleeping 5 to 8 hours nightly and does feel refreshed upon awakening.  Will take patient less than 15 minutes prior to sleep.  Patient has an [place] score of 5/24.  Patient has no complaints of skin irritation from the mask, trouble putting mask on and off, air leak, difficulty breathing while wearing CPAP.  Patient wishes to continue therapy.        Current medications are: No current outpatient medications on file..      The patient's relevant past medical, surgical, family and social history were reviewed and updated in Epic as appropriate.       Review of Systems   Respiratory: Positive for apnea.    Cardiovascular: Positive for palpitations.   Gastrointestinal:        Heartburn   Psychiatric/Behavioral: Positive for sleep disturbance.   All other systems reviewed and are negative.        Objective:    Physical Exam   Constitutional: He is oriented to person, place, and time. He appears well-developed and well-nourished.   HENT:   Head: Normocephalic and atraumatic.   Class 4 airway   Neck: No tracheal deviation present.   Pulmonary/Chest: Effort normal.   Neurological: He is alert and oriented to person, place, and time.   Skin: Skin is warm and dry.   Psychiatric: He has a normal mood and affect. His behavior is normal. Judgment and thought content normal.   72/90 days of use.  Greater than 4-hour use 72.2.  90% pressure 9.4.  AHI 1.7.  Settings 8 to 18 cm H2O.  Download reviewed with patient.      ASSESSMENT/PLAN    Grzegorz was seen today for follow-up.    Diagnoses and all orders for this visit:    REGINO (obstructive sleep apnea)  -     CPAP Therapy            1. Counseled patient regarding multimodal approach with healthy nutrition, healthy sleep, regular physical activity, social  activities, counseling, and medications. Encouraged to practice lateral 3 sleep position. Avoid alcohol and sedatives close to bedtime.  2. Verbal consent given to move forward with video visit.    I have reviewed the results of my evaluation and impression and discussed my recommendations in detail with the patient.      Signed by  MESSI Garcia    June 11, 2020      CC: Provider, No Known          No ref. provider found

## 2021-06-11 ENCOUNTER — TELEMEDICINE (OUTPATIENT)
Dept: SLEEP MEDICINE | Facility: HOSPITAL | Age: 36
End: 2021-06-11

## 2021-06-11 VITALS — HEIGHT: 71 IN | WEIGHT: 305 LBS | BODY MASS INDEX: 42.7 KG/M2

## 2021-06-11 DIAGNOSIS — G47.33 OSA (OBSTRUCTIVE SLEEP APNEA): Primary | ICD-10-CM

## 2021-06-11 PROCEDURE — 99213 OFFICE O/P EST LOW 20 MIN: CPT | Performed by: NURSE PRACTITIONER

## 2021-06-11 NOTE — PROGRESS NOTES
"    Chief Complaint:   Chief Complaint   Patient presents with   • Follow-up       HPI:    Grzegorz Gutierrez is a 35 y.o. male here for follow-up of sleep apnea.  Patient was last seen 6/11/2020.  Patient is sleeping 5 to 7 hours nightly and when he wears his CPAP he does feel rested.  He goes to sleep quickly and does not get up during the night.  Patient has an [place] of 5/24 when wearing his CPAP.  We did discuss today his use has been 53 out of the last 90 days.  Patient states he does not have trouble he has \"gotten out of the habit.\"  We have discussed the importance of wearing his CPAP and being compliant and patient states he will do better and increase use.        Current medications are: No current outpatient medications on file..      The patient's relevant past medical, surgical, family and social history were reviewed and updated in Epic as appropriate.       Review of Systems   Eyes: Positive for visual disturbance.   Respiratory: Positive for apnea.    Cardiovascular: Positive for palpitations.   Gastrointestinal:        Heartburn   Psychiatric/Behavioral: Positive for sleep disturbance.   All other systems reviewed and are negative.        Objective:    Physical Exam  Constitutional:       Appearance: Normal appearance.   HENT:      Head: Normocephalic and atraumatic.      Mouth/Throat:      Comments: Class 4 airway  Pulmonary:      Effort: Pulmonary effort is normal. No respiratory distress.   Skin:     General: Skin is dry.   Neurological:      Mental Status: He is alert and oriented to person, place, and time.   Psychiatric:         Mood and Affect: Mood normal.         Behavior: Behavior normal.         Thought Content: Thought content normal.         Judgment: Judgment normal.     53/90 days of use  Greater than 4-hour use 43.3  9% pressure 9.5  AHI 1.9  Settings 8-18      ASSESSMENT/PLAN    Diagnoses and all orders for this visit:    1. REGINO (obstructive sleep apnea) (Primary)  -     CPAP " Therapy            1. Counseled patient regarding multimodal approach with healthy nutrition, healthy sleep, regular physical activity, social activities, counseling, and medications. Encouraged to practice lateral sleep position. Avoid alcohol and sedatives close to bedtime.  2. Refill supplies x1 year.  Return to clinic 1 year or sooner symptoms warrant.  Patient is to increase use patient gave verbal consent for video visit.    I have reviewed the results of my evaluation and impression and discussed my recommendations in detail with the patient.      Signed by  MESSI Garcia    June 11, 2021      CC: Provider, No Known          No ref. provider found

## 2021-09-03 ENCOUNTER — TRANSCRIBE ORDERS (OUTPATIENT)
Dept: LAB | Facility: HOSPITAL | Age: 36
End: 2021-09-03

## 2021-09-03 ENCOUNTER — LAB (OUTPATIENT)
Dept: LAB | Facility: HOSPITAL | Age: 36
End: 2021-09-03

## 2021-09-03 DIAGNOSIS — Z20.822 COVID-19 RULED OUT: Primary | ICD-10-CM

## 2021-09-03 DIAGNOSIS — Z20.822 COVID-19 RULED OUT: ICD-10-CM

## 2021-09-03 LAB — SARS-COV-2 RNA NOSE QL NAA+PROBE: NOT DETECTED

## 2021-09-03 PROCEDURE — U0004 COV-19 TEST NON-CDC HGH THRU: HCPCS

## 2022-02-22 DIAGNOSIS — G47.33 OSA (OBSTRUCTIVE SLEEP APNEA): Primary | ICD-10-CM

## 2022-07-18 ENCOUNTER — TRANSCRIBE ORDERS (OUTPATIENT)
Dept: LAB | Facility: HOSPITAL | Age: 37
End: 2022-07-18

## 2022-07-18 ENCOUNTER — LAB (OUTPATIENT)
Dept: LAB | Facility: HOSPITAL | Age: 37
End: 2022-07-18

## 2022-07-18 DIAGNOSIS — Z13.6 SCREENING FOR ISCHEMIC HEART DISEASE: Primary | ICD-10-CM

## 2022-07-18 DIAGNOSIS — Z13.228 SCREENING FOR PHENYLKETONURIA (PKU): ICD-10-CM

## 2022-07-18 DIAGNOSIS — Z11.59 SCREENING EXAMINATION FOR POLIOMYELITIS: ICD-10-CM

## 2022-07-18 DIAGNOSIS — Z11.4 SCREENING FOR HUMAN IMMUNODEFICIENCY VIRUS: ICD-10-CM

## 2022-07-18 DIAGNOSIS — Z13.6 SCREENING FOR ISCHEMIC HEART DISEASE: ICD-10-CM

## 2022-07-18 LAB
ALBUMIN SERPL-MCNC: 4.3 G/DL (ref 3.5–5.2)
ALBUMIN/GLOB SERPL: 1.4 G/DL
ALP SERPL-CCNC: 81 U/L (ref 39–117)
ALT SERPL W P-5'-P-CCNC: 33 U/L (ref 1–41)
ANION GAP SERPL CALCULATED.3IONS-SCNC: 12 MMOL/L (ref 5–15)
AST SERPL-CCNC: 26 U/L (ref 1–40)
BASOPHILS # BLD AUTO: 0.03 10*3/MM3 (ref 0–0.2)
BASOPHILS NFR BLD AUTO: 0.4 % (ref 0–1.5)
BILIRUB SERPL-MCNC: 0.5 MG/DL (ref 0–1.2)
BUN SERPL-MCNC: 12 MG/DL (ref 6–20)
BUN/CREAT SERPL: 16 (ref 7–25)
CALCIUM SPEC-SCNC: 9.3 MG/DL (ref 8.6–10.5)
CHLORIDE SERPL-SCNC: 102 MMOL/L (ref 98–107)
CHOLEST SERPL-MCNC: 191 MG/DL (ref 0–200)
CO2 SERPL-SCNC: 25 MMOL/L (ref 22–29)
CREAT SERPL-MCNC: 0.75 MG/DL (ref 0.76–1.27)
DEPRECATED RDW RBC AUTO: 38.4 FL (ref 37–54)
EGFRCR SERPLBLD CKD-EPI 2021: 118.5 ML/MIN/1.73
EOSINOPHIL # BLD AUTO: 0.11 10*3/MM3 (ref 0–0.4)
EOSINOPHIL NFR BLD AUTO: 1.6 % (ref 0.3–6.2)
ERYTHROCYTE [DISTWIDTH] IN BLOOD BY AUTOMATED COUNT: 13.1 % (ref 12.3–15.4)
GLOBULIN UR ELPH-MCNC: 3 GM/DL
GLUCOSE SERPL-MCNC: 94 MG/DL (ref 65–99)
HBA1C MFR BLD: 5.6 % (ref 4.8–5.6)
HCT VFR BLD AUTO: 47.5 % (ref 37.5–51)
HDLC SERPL-MCNC: 37 MG/DL (ref 40–60)
HGB BLD-MCNC: 15.7 G/DL (ref 13–17.7)
HIV1 P24 AG SER QL: NORMAL
HIV1+2 AB SER QL: NORMAL
IMM GRANULOCYTES # BLD AUTO: 0.02 10*3/MM3 (ref 0–0.05)
IMM GRANULOCYTES NFR BLD AUTO: 0.3 % (ref 0–0.5)
LDLC SERPL CALC-MCNC: 128 MG/DL (ref 0–100)
LDLC/HDLC SERPL: 3.38 {RATIO}
LYMPHOCYTES # BLD AUTO: 1.77 10*3/MM3 (ref 0.7–3.1)
LYMPHOCYTES NFR BLD AUTO: 25.1 % (ref 19.6–45.3)
MCH RBC QN AUTO: 26.9 PG (ref 26.6–33)
MCHC RBC AUTO-ENTMCNC: 33.1 G/DL (ref 31.5–35.7)
MCV RBC AUTO: 81.5 FL (ref 79–97)
MONOCYTES # BLD AUTO: 0.38 10*3/MM3 (ref 0.1–0.9)
MONOCYTES NFR BLD AUTO: 5.4 % (ref 5–12)
NEUTROPHILS NFR BLD AUTO: 4.74 10*3/MM3 (ref 1.7–7)
NEUTROPHILS NFR BLD AUTO: 67.2 % (ref 42.7–76)
NRBC BLD AUTO-RTO: 0 /100 WBC (ref 0–0.2)
PLATELET # BLD AUTO: 271 10*3/MM3 (ref 140–450)
PMV BLD AUTO: 10.3 FL (ref 6–12)
POTASSIUM SERPL-SCNC: 4.7 MMOL/L (ref 3.5–5.2)
PROT SERPL-MCNC: 7.3 G/DL (ref 6–8.5)
RBC # BLD AUTO: 5.83 10*6/MM3 (ref 4.14–5.8)
SODIUM SERPL-SCNC: 139 MMOL/L (ref 136–145)
T4 FREE SERPL-MCNC: 0.9 NG/DL (ref 0.93–1.7)
TRIGL SERPL-MCNC: 145 MG/DL (ref 0–150)
TSH SERPL DL<=0.05 MIU/L-ACNC: 5.12 UIU/ML (ref 0.27–4.2)
VLDLC SERPL-MCNC: 26 MG/DL (ref 5–40)
WBC NRBC COR # BLD: 7.05 10*3/MM3 (ref 3.4–10.8)

## 2022-07-18 PROCEDURE — 36415 COLL VENOUS BLD VENIPUNCTURE: CPT

## 2022-07-18 PROCEDURE — 83036 HEMOGLOBIN GLYCOSYLATED A1C: CPT

## 2022-07-18 PROCEDURE — 84439 ASSAY OF FREE THYROXINE: CPT

## 2022-07-18 PROCEDURE — 86803 HEPATITIS C AB TEST: CPT

## 2022-07-18 PROCEDURE — 80050 GENERAL HEALTH PANEL: CPT

## 2022-07-18 PROCEDURE — 80061 LIPID PANEL: CPT

## 2022-07-18 PROCEDURE — G0475 HIV COMBINATION ASSAY: HCPCS

## 2022-07-24 LAB — REF LAB TEST RESULTS: NORMAL

## 2022-09-15 ENCOUNTER — TRANSCRIBE ORDERS (OUTPATIENT)
Dept: LAB | Facility: HOSPITAL | Age: 37
End: 2022-09-15

## 2022-09-15 ENCOUNTER — LAB (OUTPATIENT)
Dept: LAB | Facility: HOSPITAL | Age: 37
End: 2022-09-15

## 2022-09-15 DIAGNOSIS — E03.9 HYPOTHYROIDISM, UNSPECIFIED TYPE: ICD-10-CM

## 2022-09-15 DIAGNOSIS — E03.9 HYPOTHYROIDISM, UNSPECIFIED TYPE: Primary | ICD-10-CM

## 2022-09-15 PROCEDURE — 36415 COLL VENOUS BLD VENIPUNCTURE: CPT

## 2022-09-15 PROCEDURE — 84443 ASSAY THYROID STIM HORMONE: CPT

## 2022-09-16 LAB — TSH SERPL DL<=0.05 MIU/L-ACNC: 3.44 UIU/ML (ref 0.27–4.2)

## 2022-11-28 ENCOUNTER — HOSPITAL ENCOUNTER (EMERGENCY)
Facility: HOSPITAL | Age: 37
Discharge: HOME | End: 2022-11-28
Attending: EMERGENCY MEDICINE | Admitting: EMERGENCY MEDICINE

## 2022-11-28 VITALS
BODY MASS INDEX: 40.46 KG/M2 | DIASTOLIC BLOOD PRESSURE: 74 MMHG | WEIGHT: 289 LBS | HEART RATE: 93 BPM | OXYGEN SATURATION: 99 % | HEIGHT: 71 IN | TEMPERATURE: 97.9 F | RESPIRATION RATE: 18 BRPM | SYSTOLIC BLOOD PRESSURE: 133 MMHG

## 2022-11-28 DIAGNOSIS — R10.9 ABDOMINAL CRAMPING: Primary | ICD-10-CM

## 2022-11-28 LAB
ALBUMIN SERPL-MCNC: 4.2 G/DL (ref 3.5–5.2)
ALBUMIN/GLOB SERPL: 1.3 G/DL
ALP SERPL-CCNC: 108 U/L (ref 39–117)
ALT SERPL W P-5'-P-CCNC: 29 U/L (ref 1–41)
ANION GAP SERPL CALCULATED.3IONS-SCNC: 10.9 MMOL/L (ref 5–15)
AST SERPL-CCNC: 16 U/L (ref 1–40)
BASOPHILS # BLD AUTO: 0.01 10*3/MM3 (ref 0–0.2)
BASOPHILS NFR BLD AUTO: 0.1 % (ref 0–1.5)
BILIRUB SERPL-MCNC: 0.8 MG/DL (ref 0–1.2)
BILIRUB UR QL STRIP: NEGATIVE
BUN SERPL-MCNC: 15 MG/DL (ref 6–20)
BUN/CREAT SERPL: 18.8 (ref 7–25)
CALCIUM SPEC-SCNC: 8.6 MG/DL (ref 8.6–10.5)
CHLORIDE SERPL-SCNC: 99 MMOL/L (ref 98–107)
CLARITY UR: CLEAR
CO2 SERPL-SCNC: 26.1 MMOL/L (ref 22–29)
COLOR UR: YELLOW
CREAT SERPL-MCNC: 0.8 MG/DL (ref 0.76–1.27)
DEPRECATED RDW RBC AUTO: 41.8 FL (ref 37–54)
EGFRCR SERPLBLD CKD-EPI 2021: 115.5 ML/MIN/1.73
EOSINOPHIL # BLD AUTO: 0.39 10*3/MM3 (ref 0–0.4)
EOSINOPHIL NFR BLD AUTO: 3.6 % (ref 0.3–6.2)
ERYTHROCYTE [DISTWIDTH] IN BLOOD BY AUTOMATED COUNT: 14.4 % (ref 12.3–15.4)
GLOBULIN UR ELPH-MCNC: 3.3 GM/DL
GLUCOSE SERPL-MCNC: 94 MG/DL (ref 65–99)
GLUCOSE UR STRIP-MCNC: NEGATIVE MG/DL
HCT VFR BLD AUTO: 50.1 % (ref 37.5–51)
HGB BLD-MCNC: 16.8 G/DL (ref 13–17.7)
HGB UR QL STRIP.AUTO: NEGATIVE
HOLD SPECIMEN: NORMAL
IMM GRANULOCYTES # BLD AUTO: 0.03 10*3/MM3 (ref 0–0.05)
IMM GRANULOCYTES NFR BLD AUTO: 0.3 % (ref 0–0.5)
KETONES UR QL STRIP: NEGATIVE
LEUKOCYTE ESTERASE UR QL STRIP.AUTO: NEGATIVE
LIPASE SERPL-CCNC: 20 U/L (ref 13–60)
LYMPHOCYTES # BLD AUTO: 1.82 10*3/MM3 (ref 0.7–3.1)
LYMPHOCYTES NFR BLD AUTO: 16.9 % (ref 19.6–45.3)
MCH RBC QN AUTO: 27.2 PG (ref 26.6–33)
MCHC RBC AUTO-ENTMCNC: 33.5 G/DL (ref 31.5–35.7)
MCV RBC AUTO: 81.1 FL (ref 79–97)
MONOCYTES # BLD AUTO: 0.75 10*3/MM3 (ref 0.1–0.9)
MONOCYTES NFR BLD AUTO: 7 % (ref 5–12)
NEUTROPHILS NFR BLD AUTO: 7.79 10*3/MM3 (ref 1.7–7)
NEUTROPHILS NFR BLD AUTO: 72.1 % (ref 42.7–76)
NITRITE UR QL STRIP: NEGATIVE
NRBC BLD AUTO-RTO: 0 /100 WBC (ref 0–0.2)
PH UR STRIP.AUTO: 5.5 [PH] (ref 5–8)
PLATELET # BLD AUTO: 261 10*3/MM3 (ref 140–450)
PMV BLD AUTO: 9.6 FL (ref 6–12)
POTASSIUM SERPL-SCNC: 3.9 MMOL/L (ref 3.5–5.2)
PROT SERPL-MCNC: 7.5 G/DL (ref 6–8.5)
PROT UR QL STRIP: NEGATIVE
RBC # BLD AUTO: 6.18 10*6/MM3 (ref 4.14–5.8)
SODIUM SERPL-SCNC: 136 MMOL/L (ref 136–145)
SP GR UR STRIP: 1.01 (ref 1–1.03)
UROBILINOGEN UR QL STRIP: NORMAL
WBC NRBC COR # BLD: 10.79 10*3/MM3 (ref 3.4–10.8)
WHOLE BLOOD HOLD SPECIMEN: NORMAL

## 2022-11-28 PROCEDURE — 83690 ASSAY OF LIPASE: CPT | Performed by: EMERGENCY MEDICINE

## 2022-11-28 PROCEDURE — 85025 COMPLETE CBC W/AUTO DIFF WBC: CPT | Performed by: EMERGENCY MEDICINE

## 2022-11-28 PROCEDURE — 96374 THER/PROPH/DIAG INJ IV PUSH: CPT

## 2022-11-28 PROCEDURE — 25010000002 ONDANSETRON PER 1 MG: Performed by: EMERGENCY MEDICINE

## 2022-11-28 PROCEDURE — 99283 EMERGENCY DEPT VISIT LOW MDM: CPT

## 2022-11-28 PROCEDURE — 81003 URINALYSIS AUTO W/O SCOPE: CPT | Performed by: EMERGENCY MEDICINE

## 2022-11-28 PROCEDURE — 80053 COMPREHEN METABOLIC PANEL: CPT | Performed by: EMERGENCY MEDICINE

## 2022-11-28 RX ORDER — TIRZEPATIDE 7.5 MG/.5ML
7.5 INJECTION, SOLUTION SUBCUTANEOUS WEEKLY
COMMUNITY

## 2022-11-28 RX ORDER — LEVOTHYROXINE SODIUM 0.03 MG/1
25 TABLET ORAL DAILY
COMMUNITY

## 2022-11-28 RX ORDER — SODIUM CHLORIDE 0.9 % (FLUSH) 0.9 %
10 SYRINGE (ML) INJECTION AS NEEDED
Status: DISCONTINUED | OUTPATIENT
Start: 2022-11-28 | End: 2022-11-28 | Stop reason: HOSPADM

## 2022-11-28 RX ORDER — ONDANSETRON 2 MG/ML
4 INJECTION INTRAMUSCULAR; INTRAVENOUS ONCE
Status: COMPLETED | OUTPATIENT
Start: 2022-11-28 | End: 2022-11-28

## 2022-11-28 RX ORDER — ONDANSETRON 4 MG/1
4 TABLET, ORALLY DISINTEGRATING ORAL EVERY 6 HOURS PRN
Qty: 10 TABLET | Refills: 0 | Status: SHIPPED | OUTPATIENT
Start: 2022-11-28

## 2022-11-28 RX ADMIN — SODIUM CHLORIDE 1000 ML: 9 INJECTION, SOLUTION INTRAVENOUS at 10:31

## 2022-11-28 RX ADMIN — ONDANSETRON 4 MG: 2 INJECTION INTRAMUSCULAR; INTRAVENOUS at 10:30

## 2022-11-30 ENCOUNTER — HOSPITAL ENCOUNTER (EMERGENCY)
Facility: HOSPITAL | Age: 37
Discharge: HOME | End: 2022-11-30

## 2022-11-30 PROCEDURE — 99211 OFF/OP EST MAY X REQ PHY/QHP: CPT

## 2023-02-09 ENCOUNTER — TELEPHONE (OUTPATIENT)
Dept: SURGERY | Facility: CLINIC | Age: 38
End: 2023-02-09
Payer: COMMERCIAL

## 2023-10-20 ENCOUNTER — TRANSCRIBE ORDERS (OUTPATIENT)
Dept: ADMINISTRATIVE | Facility: HOSPITAL | Age: 38
End: 2023-10-20
Payer: COMMERCIAL

## 2023-10-20 DIAGNOSIS — M25.511 RIGHT SHOULDER PAIN, UNSPECIFIED CHRONICITY: Primary | ICD-10-CM

## 2023-11-03 ENCOUNTER — HOSPITAL ENCOUNTER (OUTPATIENT)
Dept: CT IMAGING | Facility: HOSPITAL | Age: 38
Discharge: HOME | End: 2023-11-03
Payer: COMMERCIAL

## 2023-11-03 DIAGNOSIS — M25.511 RIGHT SHOULDER PAIN, UNSPECIFIED CHRONICITY: ICD-10-CM

## 2023-11-03 PROCEDURE — 73200 CT UPPER EXTREMITY W/O DYE: CPT

## 2023-12-22 ENCOUNTER — OFFICE VISIT (OUTPATIENT)
Dept: ORTHOPEDIC SURGERY | Facility: CLINIC | Age: 38
End: 2023-12-22
Payer: COMMERCIAL

## 2023-12-22 ENCOUNTER — HOSPITAL ENCOUNTER (OUTPATIENT)
Dept: GENERAL RADIOLOGY | Facility: HOSPITAL | Age: 38
Discharge: HOME | End: 2023-12-22
Admitting: PHYSICIAN ASSISTANT
Payer: COMMERCIAL

## 2023-12-22 VITALS
HEIGHT: 71 IN | DIASTOLIC BLOOD PRESSURE: 96 MMHG | SYSTOLIC BLOOD PRESSURE: 142 MMHG | BODY MASS INDEX: 43.4 KG/M2 | WEIGHT: 310 LBS | HEART RATE: 88 BPM

## 2023-12-22 DIAGNOSIS — M25.361 PATELLOFEMORAL INSTABILITY OF RIGHT KNEE WITH PAIN: Primary | ICD-10-CM

## 2023-12-22 DIAGNOSIS — M75.41 IMPINGEMENT SYNDROME OF RIGHT SHOULDER: ICD-10-CM

## 2023-12-22 DIAGNOSIS — M25.561 RIGHT KNEE PAIN, UNSPECIFIED CHRONICITY: ICD-10-CM

## 2023-12-22 DIAGNOSIS — M25.561 PATELLOFEMORAL INSTABILITY OF RIGHT KNEE WITH PAIN: Primary | ICD-10-CM

## 2023-12-22 PROCEDURE — 73562 X-RAY EXAM OF KNEE 3: CPT

## 2023-12-22 PROCEDURE — 99203 OFFICE O/P NEW LOW 30 MIN: CPT | Performed by: PHYSICIAN ASSISTANT

## 2023-12-22 RX ORDER — NAPROXEN 500 MG/1
500 TABLET ORAL 2 TIMES DAILY WITH MEALS
Qty: 60 TABLET | Refills: 2 | Status: SHIPPED | OUTPATIENT
Start: 2023-12-22

## 2023-12-22 NOTE — PROGRESS NOTES
[facility] Patient Visit          Patient: Grzegorz Gutierrez  YOB: 1985  Date of Encounter: 12/22/2023  PCP: Mary Kearney APRN      Subjective     Chief Complaint   Patient presents with    Right Knee - Initial Evaluation, Pain    Right Shoulder - Initial Evaluation, Pain           History of Present Illness:     Grzegorz Gutierrez is a 38 y.o. male presents today with complaints of both right knee and right shoulder pain and symptoms.  The patient reports that he was initially sent in reference to his right knee in which she has had progressive pain and worsening difficulty upon getting up from seated position as well as prolonged standing or sitting.  Patient states that he had taken a job from which she was standing for upwards of 10 hours/day and has now become more sedentary with availability to sit for several hours at a time.  Patient reports popping and grinding and catching to the anterior portion of the knee with occasional catching.  He recalls no specific injury.  Patient reports no previous treatment and presents for radiographic review of the right knee today.  In reference to the patient's right shoulder patient states that he has had no specific injury however over the last several months he has began to notice pain following an incidence when he began to take kayaking and this motion seemed to exacerbate and worsen his pain and symptoms.  He has had difficulty with sharp stabbing pain to the outside of the shoulder for which she had difficulty with abduction of the shoulder.  Patient has not attempted occasional anti-inflammatory medication and previously undergone radiographs as well as CT scan that revealed no bony abnormalities outside of AC joint degeneration.  Patient reports pain to the lateral aspect of the shoulder radiating down to the mid upper arm.  This is much improved from the previous exacerbation.  Patient declines any  instability.        Patient Active Problem List   Diagnosis    Palpitation    Snoring    Morbid obesity    REGINO (obstructive sleep apnea)     Past Medical History:   Diagnosis Date    Disease of thyroid gland     Prediabetes      History reviewed. No pertinent surgical history.  Social History     Occupational History    Not on file   Tobacco Use    Smoking status: Never     Passive exposure: Never    Smokeless tobacco: Never   Vaping Use    Vaping Use: Never used   Substance and Sexual Activity    Alcohol use: Not Currently     Comment: rare    Drug use: Never    Sexual activity: Yes     Partners: Female    Grzegorz Gutierrez  reports that he has never smoked. He has never been exposed to tobacco smoke. He has never used smokeless tobacco.. I have educated him on the risk of diseases from using tobacco products such as cancer, COPD, and heart disease.        Social History     Social History Narrative    Caffeine:0-1  serving per day. None this week.     Family History   Problem Relation Age of Onset    Hypertension Mother     No Known Problems Father     No Known Problems Sister     No Known Problems Brother     Hypertension Maternal Grandmother     Stroke Maternal Grandmother     Heart attack Paternal Grandmother      Current Outpatient Medications   Medication Sig Dispense Refill    levothyroxine (SYNTHROID, LEVOTHROID) 25 MCG tablet Take 1 tablet by mouth Daily.      naproxen (NAPROSYN) 500 MG tablet Take 1 tablet by mouth 2 (Two) Times a Day With Meals. 60 tablet 2    ondansetron ODT (ZOFRAN-ODT) 4 MG disintegrating tablet Place 1 tablet under the tongue Every 6 (Six) Hours As Needed for Nausea or Vomiting. 10 tablet 0    Tirzepatide (Mounjaro) 7.5 MG/0.5ML solution pen-injector Inject 7.5 mg under the skin into the appropriate area as directed 1 (One) Time Per Week.       No current facility-administered medications for this visit.     No Known Allergies         Review of Systems   Constitutional: Negative.  "  HENT: Negative.     Eyes: Negative.    Cardiovascular: Negative.    Respiratory: Negative.          Sleep apnea   Endocrine: Negative.    Hematologic/Lymphatic: Negative.    Skin: Negative.    Musculoskeletal:         Pertinent positives listed in HPI   Gastrointestinal: Negative.    Genitourinary: Negative.    Neurological: Negative.    Psychiatric/Behavioral: Negative.     Allergic/Immunologic: Negative.          Objective      Vitals:    12/22/23 0859   BP: 142/96   Pulse: 88   Weight: (!) 141 kg (310 lb)   Height: 180.3 cm (71\")      Class 3 Severe Obesity (BMI >=40). Obesity-related health conditions include the following:  listed in PMH . Obesity is newly identified. BMI is is above average; BMI management plan is completed. We discussed portion control and increasing exercise.      Physical Exam  Vitals and nursing note reviewed.   Constitutional:       General: He is not in acute distress.     Appearance: Normal appearance. He is not ill-appearing.   HENT:      Head: Normocephalic and atraumatic.      Right Ear: External ear normal.      Left Ear: External ear normal.      Nose: Nose normal.      Mouth/Throat:      Mouth: Mucous membranes are moist.      Pharynx: Oropharynx is clear.   Eyes:      Extraocular Movements: Extraocular movements intact.      Conjunctiva/sclera: Conjunctivae normal.      Pupils: Pupils are equal, round, and reactive to light.   Cardiovascular:      Rate and Rhythm: Normal rate.      Pulses: Normal pulses.   Pulmonary:      Effort: Pulmonary effort is normal.   Abdominal:      General: There is no distension.   Musculoskeletal:      Cervical back: Normal range of motion. No rigidity.      Comments: Right knee on examination today reveals patellofemoral crepitus.  Patient has positive J sign with VMO atrophy.  Lateral patella tilt with full flexion extension no pain or instability upon varus/valgus stress.  Lachman and drawer testing negative.  Neurovascular status grossly intact " right lower extremity.    Right shoulder examination today reveals lateral subacromial tenderness with palpation.  The patient exhibits forward flexion greater than 90 degrees.  Abduction 120 degrees.  Internal rotation to the iliac crest comparable to contralateral shoulder.  Patient exhibits positive Jobes maneuver with strength intact.  Speed's Test negative.  Acosta and Neer's painful.  Mild tenderness palpation along the AC joint.  Crossarm testing negative.  Neurovascular status grossly intact right upper extremity.   Skin:     General: Skin is warm and dry.      Capillary Refill: Capillary refill takes less than 2 seconds.   Neurological:      General: No focal deficit present.      Mental Status: He is alert and oriented to person, place, and time.   Psychiatric:         Mood and Affect: Mood normal.         Behavior: Behavior normal.                 Radiology:      XR Knee 3 View Right    Result Date: 12/22/2023    Lateralization of patella from expected location within patellar groove.   This report was finalized on 12/22/2023 9:17 AM by Dr. Urbano Murdock MD.      CT Upper Extremity Right Without Contrast    Result Date: 11/3/2023  1.  No acute fracture or dislocation. 2.  No acute soft tissue abnormalities evident on CT. 3.  Mild AC joint arthropathy.   This report was finalized on 11/3/2023 12:48 PM by Dr. Urbano Murdock MD.             Assessment/Plan        ICD-10-CM ICD-9-CM   1. Patellofemoral instability of right knee with pain  M25.361 718.86    M25.561 719.46   2. Right knee pain, unspecified chronicity  M25.561 719.46   3. Impingement syndrome of right shoulder  M75.41 726.2     38-year-old male with notable progressive right patellofemoral pain syndrome with maltracking and lateralization of the patella.  Further discussion was had with the patient and he will implement formal outpatient physical therapy with aggressive VMO measures in an effort to regain and reconstitute adequate patellar  tracking.  Prescription for Naprosyn 500 mg 1 p.o. twice daily dispense #60 with 2 refills.  In relation to the patient's right shoulder the patient seems to be struggling with right shoulder impingement and some degree of AC joint degeneration leading to this.  There is no direct surgical indication and the patient will implement also formal outpatient physical therapy in reference to the shoulder.  He will also implement the Naprosyn that was previously prescribed.  The patient will return back in 4 weeks for further evaluation noted surgical indication                        This document was signed by Cruz Romeo PA-C December 22, 2023     CC: Mary Kearney APRN      Dictated Utilizing Dragon Dictation:   Please note that portions of this note were completed with a voice recognition program.   Part of this note may be an electronic transcription/translation of spoken language to printed text using the Dragon Dictation System.

## 2024-01-12 ENCOUNTER — TREATMENT (OUTPATIENT)
Dept: PHYSICAL THERAPY | Facility: CLINIC | Age: 39
End: 2024-01-12
Payer: COMMERCIAL

## 2024-01-12 DIAGNOSIS — M25.811 IMPINGEMENT OF RIGHT SHOULDER: ICD-10-CM

## 2024-01-12 DIAGNOSIS — M22.2X1 RIGHT PATELLOFEMORAL SYNDROME: ICD-10-CM

## 2024-01-12 DIAGNOSIS — M25.561 CHRONIC PAIN OF RIGHT KNEE: Primary | ICD-10-CM

## 2024-01-12 DIAGNOSIS — G89.29 CHRONIC PAIN OF RIGHT KNEE: Primary | ICD-10-CM

## 2024-01-12 PROCEDURE — 97162 PT EVAL MOD COMPLEX 30 MIN: CPT | Performed by: PHYSICAL THERAPIST

## 2024-01-12 PROCEDURE — 97110 THERAPEUTIC EXERCISES: CPT | Performed by: PHYSICAL THERAPIST
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
## 2024-01-19 ENCOUNTER — TREATMENT (OUTPATIENT)
Dept: PHYSICAL THERAPY | Facility: CLINIC | Age: 39
End: 2024-01-19
Payer: COMMERCIAL

## 2024-01-19 DIAGNOSIS — M25.811 IMPINGEMENT OF RIGHT SHOULDER: Primary | ICD-10-CM

## 2024-01-19 DIAGNOSIS — G89.29 CHRONIC PAIN OF RIGHT KNEE: ICD-10-CM

## 2024-01-19 DIAGNOSIS — M22.2X1 RIGHT PATELLOFEMORAL SYNDROME: ICD-10-CM

## 2024-01-19 DIAGNOSIS — M25.561 CHRONIC PAIN OF RIGHT KNEE: ICD-10-CM

## 2024-01-19 PROCEDURE — 97110 THERAPEUTIC EXERCISES: CPT | Performed by: PHYSICAL THERAPIST

## 2024-01-19 PROCEDURE — G0283 ELEC STIM OTHER THAN WOUND: HCPCS | Performed by: PHYSICAL THERAPIST

## 2024-01-19 PROCEDURE — 97112 NEUROMUSCULAR REEDUCATION: CPT | Performed by: PHYSICAL THERAPIST

## 2024-01-19 NOTE — PROGRESS NOTES
Physical Therapy Daily Treatment Note      Patient: Grzegorz Gutierrez   : 1985  Referring practitioner: TIMOTHY Brunner  Date of Initial Visit: Type: THERAPY  Noted: 2024  Today's Date: 2024  Patient seen for 2 sessions       Visit Diagnoses:    ICD-10-CM ICD-9-CM   1. Impingement of right shoulder  M25.811 719.81   2. Chronic pain of right knee  M25.561 719.46    G89.29 338.29   3. Right patellofemoral syndrome  M22.2X1 719.46       Subjective Evaluation    History of Present Illness    Subjective comment: Pt has 7/10 knee pain today.  Pt reports the taping has helped.       Objective   See Exercise, Manual, and Modality Logs for complete treatment.       Assessment & Plan       Assessment  Assessment details: Tx today consisted of patella mobs to right knee; followed by manual stretching and there ex for postural stability and shoulder and knee stability; proprioceptive training and ended with ice and estim to right knee.  Pt cont to report good response of to patella taping and reported 5/10 post pain.  Pt demonstrated difficulty with tandem and single leg stance activities today.    Plan  Plan details: Will follow progressing knee stability and decreased pain.          Timed:         Manual Therapy:         mins  13900;     Therapeutic Exercise:    31     mins  71871;     Neuromuscular Rony:    12    mins  84817;    Therapeutic Activity:          mins  53729;     Gait Training:           mins  02726;     Ultrasound:          mins  84943;    Ionto                                   mins   89090  Self Care                            mins   29287  Canalith Repos         mins 47029      Un-Timed:  Electrical Stimulation:    10     mins  44128 ( );  Dry Needling          mins self-pay  Traction          mins 79849      Timed Treatment:   43   mins   Total Treatment:     53   mins    Huy Sutton PT  [place] License: [id number]      Electronically signed by Huy Sutton PT,  01/19/24, 10:10 AM EST

## 2024-01-26 ENCOUNTER — OFFICE VISIT (OUTPATIENT)
Dept: ORTHOPEDIC SURGERY | Facility: CLINIC | Age: 39
End: 2024-01-26
Payer: COMMERCIAL

## 2024-01-26 ENCOUNTER — TELEPHONE (OUTPATIENT)
Dept: ORTHOPEDICS | Facility: OTHER | Age: 39
End: 2024-01-26
Payer: COMMERCIAL

## 2024-01-26 VITALS — WEIGHT: 315 LBS | HEIGHT: 71 IN | BODY MASS INDEX: 44.1 KG/M2

## 2024-01-26 DIAGNOSIS — M25.361 PATELLOFEMORAL INSTABILITY OF RIGHT KNEE WITH PAIN: ICD-10-CM

## 2024-01-26 DIAGNOSIS — M75.41 IMPINGEMENT SYNDROME OF RIGHT SHOULDER: ICD-10-CM

## 2024-01-26 DIAGNOSIS — M25.561 PATELLOFEMORAL INSTABILITY OF RIGHT KNEE WITH PAIN: ICD-10-CM

## 2024-01-26 DIAGNOSIS — M25.561 RIGHT KNEE PAIN, UNSPECIFIED CHRONICITY: Primary | ICD-10-CM

## 2024-01-26 PROCEDURE — 99213 OFFICE O/P EST LOW 20 MIN: CPT | Performed by: PHYSICIAN ASSISTANT

## 2024-02-02 ENCOUNTER — TREATMENT (OUTPATIENT)
Dept: PHYSICAL THERAPY | Facility: CLINIC | Age: 39
End: 2024-02-02
Payer: COMMERCIAL

## 2024-02-02 DIAGNOSIS — M25.561 CHRONIC PAIN OF RIGHT KNEE: ICD-10-CM

## 2024-02-02 DIAGNOSIS — M25.811 IMPINGEMENT OF RIGHT SHOULDER: Primary | ICD-10-CM

## 2024-02-02 DIAGNOSIS — G89.29 CHRONIC PAIN OF RIGHT KNEE: ICD-10-CM

## 2024-02-02 DIAGNOSIS — M22.2X1 RIGHT PATELLOFEMORAL SYNDROME: ICD-10-CM

## 2024-02-02 PROCEDURE — 97112 NEUROMUSCULAR REEDUCATION: CPT | Performed by: PHYSICAL THERAPIST

## 2024-02-02 PROCEDURE — 97035 APP MDLTY 1+ULTRASOUND EA 15: CPT | Performed by: PHYSICAL THERAPIST

## 2024-02-02 PROCEDURE — 97110 THERAPEUTIC EXERCISES: CPT | Performed by: PHYSICAL THERAPIST

## 2024-02-02 NOTE — PROGRESS NOTES
Physical Therapy Daily Treatment Note      Patient: Grzegorz Gutierrez   : 1985  Referring practitioner: TIMOTHY Brunner  Date of Initial Visit: Type: THERAPY  Noted: 2024  Today's Date: 2024  Patient seen for 3 sessions       Visit Diagnoses:    ICD-10-CM ICD-9-CM   1. Impingement of right shoulder  M25.811 719.81   2. Chronic pain of right knee  M25.561 719.46    G89.29 338.29   3. Right patellofemoral syndrome  M22.2X1 719.46       Subjective Evaluation    History of Present Illness    Subjective comment: Pt saw his referring provider last week and he was issued a J brace.  Pt reports therapy has helped and he has 5/10 pain today.     Objective   See Exercise, Manual, and Modality Logs for complete treatment.       Assessment & Plan       Assessment  Assessment details: Tx today consisted of RLE stretching, there ex for improved quad stability and patella mobs; proprioceptive training for improved balance and quad stability and ended with US to lateral right knee and ice.  Pt noted to have improved balance with cont left ankle weakness.  Pt reported 4/10 post pain today.    Plan  Plan details: Will follow progressing knee stability and decreased pain.          Timed:         Manual Therapy:         mins  89603;     Therapeutic Exercise:    36     mins  90740;     Neuromuscular Rony:    12    mins  13362;    Therapeutic Activity:          mins  35983;     Gait Training:           mins  24710;     Ultrasound:     8     mins  78971;    Ionto                                   mins   23010  Self Care                            mins   92548  Canalith Repos         mins 64444      Un-Timed:  Electrical Stimulation:         mins  29152 (MC );  Dry Needling          mins self-pay  Traction          mins 37984      Timed Treatment:   56   mins   Total Treatment:     62   mins(6 min ice)    Huy Sutton PT  [place] License: [id number]      Electronically signed by Huy Sutton PT, 24,  11:04 AM EST

## 2024-02-09 ENCOUNTER — TREATMENT (OUTPATIENT)
Dept: PHYSICAL THERAPY | Facility: CLINIC | Age: 39
End: 2024-02-09
Payer: COMMERCIAL

## 2024-02-09 DIAGNOSIS — M22.2X1 RIGHT PATELLOFEMORAL SYNDROME: ICD-10-CM

## 2024-02-09 DIAGNOSIS — M25.811 IMPINGEMENT OF RIGHT SHOULDER: Primary | ICD-10-CM

## 2024-02-09 DIAGNOSIS — G89.29 CHRONIC PAIN OF RIGHT KNEE: ICD-10-CM

## 2024-02-09 DIAGNOSIS — M25.561 CHRONIC PAIN OF RIGHT KNEE: ICD-10-CM

## 2024-02-09 PROCEDURE — 97112 NEUROMUSCULAR REEDUCATION: CPT | Performed by: PHYSICAL THERAPIST

## 2024-02-09 PROCEDURE — 97110 THERAPEUTIC EXERCISES: CPT | Performed by: PHYSICAL THERAPIST

## 2024-02-09 NOTE — PROGRESS NOTES
Physical Therapy Daily Treatment Note      Patient: Grzegorz Gutierrez   : 1985  Referring practitioner: TIMOTHY Brunner  Date of Initial Visit: Type: THERAPY  Noted: 2024  Today's Date: 2024  Patient seen for 4 sessions       Visit Diagnoses:    ICD-10-CM ICD-9-CM   1. Impingement of right shoulder  M25.811 719.81   2. Chronic pain of right knee  M25.561 719.46    G89.29 338.29   3. Right patellofemoral syndrome  M22.2X1 719.46       Subjective Evaluation    History of Present Illness    Subjective comment: Pt reports his knee continues to improve.  He has 4/10 pain today.  Pt taped his knee earlier today.       Objective   See Exercise, Manual, and Modality Logs for complete treatment.       Assessment & Plan       Assessment  Assessment details: Tx today consisted of LE stretching; followed by standing exercises for improved knee stability and proprioceptive training for improved balance.  Pt reported mild pain with step ups and step ups abbreviated. Pt responded well to added bosu squats and clocks and TM today.  Pt reported no pain following session.    Plan  Plan details: Will follow progressing knee stability and decreased pain.          Timed:         Manual Therapy:         mins  81449;     Therapeutic Exercise:    34     mins  05608;     Neuromuscular Rony:    15    mins  84665;    Therapeutic Activity:          mins  02977;     Gait Training:           mins  15754;     Ultrasound:          mins  51414;    Ionto                                   mins   57955  Self Care                            mins   26125  Canalith Repos         mins 97203      Un-Timed:  Electrical Stimulation:         mins  20610 (MC );  Dry Needling          mins self-pay  Traction          mins 52335      Timed Treatment:   49   mins   Total Treatment:     55   mins(6min ice)    Huy Sutton PT  [place] License: [id number]      Electronically signed by Huy Sutton PT, 24, 10:56 AM EST

## 2024-02-10 NOTE — PROGRESS NOTES
[facility] [place] [place] Visit        Patient: Grzegorz Gutierrez  YOB: 1985  Date of Encounter: 1/26/2024  PCP: Mary Kearney APRN      Subjective     Chief Complaint   Patient presents with    Right Knee - Follow-up, Pain           History of Present Illness:     Grzegorz Gutierrez is a 38 y.o. male presents today for follow-up of right knee.  Patient states that the NSAIDs have been helping to alleviate pain physical therapy has been beneficial however he still feels some instability.  He reports no other new complaints today.  Patient denies any paresthesias.        Patient Active Problem List   Diagnosis    Palpitation    Snoring    Morbid obesity    REGINO (obstructive sleep apnea)     Past Medical History:   Diagnosis Date    Disease of thyroid gland     Prediabetes      No past surgical history on file.  Social History     Occupational History    Not on file   Tobacco Use    Smoking status: Never     Passive exposure: Never    Smokeless tobacco: Never   Vaping Use    Vaping Use: Never used   Substance and Sexual Activity    Alcohol use: Not Currently     Comment: rare    Drug use: Never    Sexual activity: Yes     Partners: Female    Grzegorz Gutierrez  reports that he has never smoked. He has never been exposed to tobacco smoke. He has never used smokeless tobacco.. I have educated him on the risk of diseases from using tobacco products such as cancer, COPD, and heart disease.        Social History     Social History Narrative    Caffeine:0-1  serving per day. None this week.     Family History   Problem Relation Age of Onset    Hypertension Mother     No Known Problems Father     No Known Problems Sister     No Known Problems Brother     Hypertension Maternal Grandmother     Stroke Maternal Grandmother     Heart attack Paternal Grandmother      Current Outpatient Medications   Medication Sig Dispense Refill    levothyroxine (SYNTHROID, LEVOTHROID) 25 MCG tablet Take 1  "tablet by mouth Daily.      naproxen (NAPROSYN) 500 MG tablet Take 1 tablet by mouth 2 (Two) Times a Day With Meals. 60 tablet 2    ondansetron ODT (ZOFRAN-ODT) 4 MG disintegrating tablet Place 1 tablet under the tongue Every 6 (Six) Hours As Needed for Nausea or Vomiting. 10 tablet 0    Tirzepatide (Mounjaro) 7.5 MG/0.5ML solution pen-injector Inject 7.5 mg under the skin into the appropriate area as directed 1 (One) Time Per Week.       No current facility-administered medications for this visit.     No Known Allergies         Review of Systems   Constitutional: Negative.   HENT: Negative.     Eyes: Negative.    Cardiovascular: Negative.    Respiratory: Negative.          Sleep apnea   Endocrine: Negative.    Hematologic/Lymphatic: Negative.    Skin: Negative.    Musculoskeletal:         Pertinent positives listed in HPI   Gastrointestinal: Negative.    Genitourinary: Negative.    Neurological: Negative.    Psychiatric/Behavioral: Negative.     Allergic/Immunologic: Negative.          Objective      Vitals:    01/26/24 0852   Weight: (!) 148 kg (327 lb)   Height: 180.3 cm (71\")   PainSc:   4   PainLoc: Knee      Class 3 Severe Obesity (BMI >=40). Obesity-related health conditions include the following:  listed in PMH . Obesity is newly identified. BMI is is above average; BMI management plan is completed. We discussed portion control and increasing exercise.      Physical Exam  Vitals and nursing note reviewed.   Constitutional:       General: He is not in acute distress.     Appearance: Normal appearance. He is not ill-appearing.   HENT:      Head: Normocephalic and atraumatic.      Right Ear: External ear normal.      Left Ear: External ear normal.      Nose: Nose normal.      Mouth/Throat:      Mouth: Mucous membranes are moist.      Pharynx: Oropharynx is clear.   Eyes:      Extraocular Movements: Extraocular movements intact.      Conjunctiva/sclera: Conjunctivae normal.      Pupils: Pupils are equal, round, " and reactive to light.   Cardiovascular:      Rate and Rhythm: Normal rate.      Pulses: Normal pulses.   Pulmonary:      Effort: Pulmonary effort is normal.   Abdominal:      General: There is no distension.   Musculoskeletal:      Cervical back: Normal range of motion. No rigidity.      Comments: Right knee on examination today reveals patellofemoral crepitus.  Patient has positive J sign with VMO atrophy.  Lateral patella tilt with full flexion extension no pain or instability upon varus/valgus stress.  Lachman and drawer testing negative.  Neurovascular status grossly intact right lower extremity.    Right shoulder examination today reveals lateral subacromial tenderness with palpation.  The patient exhibits forward flexion greater than 90 degrees.  Abduction 120 degrees.  Internal rotation to the iliac crest comparable to contralateral shoulder.  Patient exhibits positive Jobes maneuver with strength intact.  Speed's Test negative.  Acosta and Neer's painful.  Mild tenderness palpation along the AC joint.  Crossarm testing negative.  Neurovascular status grossly intact right upper extremity.   Skin:     General: Skin is warm and dry.      Capillary Refill: Capillary refill takes less than 2 seconds.   Neurological:      General: No focal deficit present.      Mental Status: He is alert and oriented to person, place, and time.   Psychiatric:         Mood and Affect: Mood normal.         Behavior: Behavior normal.                 Radiology:      XR Knee 3 View Right    Result Date: 12/22/2023    Lateralization of patella from expected location within patellar groove.   This report was finalized on 12/22/2023 9:17 AM by Dr. Urbano Murdock MD.             Assessment/Plan        ICD-10-CM ICD-9-CM   1. Right knee pain, unspecified chronicity  M25.561 719.46   2. Patellofemoral instability of right knee with pain  M25.361 718.86    M25.561 719.46   3. Impingement syndrome of right shoulder  M75.41 726.2        38-year-old male with notable progressive right patellofemoral pain syndrome with maltracking and lateralization of the patella.  The patient has seen some improvement with the NSAIDs as well as the formal outpatient physical therapy in reference to this.  Today, the patient was provided with a lateral J brace for stability and support.  Secondary to the patient's right shoulder and shoulder impingement symptoms patient has seen improvement with the medication as well as a formal outpatient physical therapy.  Will continue to monitor and evaluate upon any significant complications.  The patient will return in 6 weeks for follow-up evaluation.  We discussed possibility of potential intra-articular steroid injection depending on efficacy of the conservative treatment options.                      This document was signed by Cruz Romeo PA-C January 26, 2024    CC: Mary Kearney APRN      Dictated Utilizing Dragon Dictation:   Please note that portions of this note were completed with a voice recognition program.   Part of this note may be an electronic transcription/translation of spoken language to printed text using the Dragon Dictation System.

## 2024-02-23 ENCOUNTER — TELEPHONE (OUTPATIENT)
Dept: PHYSICAL THERAPY | Facility: CLINIC | Age: 39
End: 2024-02-23

## 2024-03-01 ENCOUNTER — TREATMENT (OUTPATIENT)
Dept: PHYSICAL THERAPY | Facility: CLINIC | Age: 39
End: 2024-03-01
Payer: COMMERCIAL

## 2024-03-01 ENCOUNTER — TELEPHONE (OUTPATIENT)
Dept: ORTHOPEDIC SURGERY | Facility: CLINIC | Age: 39
End: 2024-03-01
Payer: COMMERCIAL

## 2024-03-01 DIAGNOSIS — M22.2X1 RIGHT PATELLOFEMORAL SYNDROME: ICD-10-CM

## 2024-03-01 DIAGNOSIS — G89.29 CHRONIC PAIN OF RIGHT KNEE: ICD-10-CM

## 2024-03-01 DIAGNOSIS — M25.811 IMPINGEMENT OF RIGHT SHOULDER: Primary | ICD-10-CM

## 2024-03-01 DIAGNOSIS — M25.561 CHRONIC PAIN OF RIGHT KNEE: ICD-10-CM

## 2024-03-01 PROCEDURE — 97110 THERAPEUTIC EXERCISES: CPT | Performed by: PHYSICAL THERAPIST

## 2024-03-01 PROCEDURE — 97112 NEUROMUSCULAR REEDUCATION: CPT | Performed by: PHYSICAL THERAPIST

## 2024-03-01 NOTE — PROGRESS NOTES
Physical Therapy Re Certification Of Plan of Care  Patient: Grzegorz Gutierrez   : 1985  Diagnosis/ICD-10 Code:  Impingement of right shoulder [M25.811]  Referring practitioner: TIMOTHY Brunner  Date of Initial Visit: Type: THERAPY  Noted: 2024  Today's Date: 3/1/2024  Patient seen for 5 sessions         Visit Diagnoses:    ICD-10-CM ICD-9-CM   1. Impingement of right shoulder  M25.811 719.81   2. Right patellofemoral syndrome  M22.2X1 719.46   3. Chronic pain of right knee  M25.561 719.46    G89.29 338.29         Grzegorz Gutierrez reports:   Subjective Questionnaire:   Clinical Progress: improved  Home Program Compliance: Yes  Treatment has included: therapeutic exercise, neuromuscular re-education, manual therapy, therapeutic activity, gait training, electrical stimulation, ultrasound, moist heat, and cryotherapy      Subjective Evaluation    History of Present Illness    Subjective comment: Pt reports he is walking better.  Pt reports he would like to be discharged today.  Pt will try to get a smaller brace for his knee.Pain  Current pain rating: 3  At best pain ratin  At worst pain ratin           Objective       Assessment & Plan       Assessment  Impairments: abnormal coordination, abnormal gait, abnormal muscle firing, abnormal muscle tone, abnormal or restricted ROM, activity intolerance, impaired physical strength, lacks appropriate home exercise program, pain with function and weight-bearing intolerance   Functional limitations: walking, uncomfortable because of pain, standing, reaching behind back and unable to perform repetitive tasks   Assessment details: Pt is a 37 y/o male referred for treatment of right shoulder impingement and right knee PFS. Pt has attended 5 sessions with good participation.  Pt reports minimal shoulder pain and improved knee stability with decreased pain with taping an brace.  Pt reports average pain to be 3/10.  Pt demonstrated good understanding of HEP  and has requested to be discharged at this time.  Prognosis: good    Goals  Plan Goals: STG 6 weeks    1 Pt will be instructed in a HEP.met  2 Pt will report pain no greater than 5/10 in right knee with increased walking.not met  3 Pt will improve his LEFs score to less than 30%.NT    LTG 12 weeks    1 Pt will improve his LEFs to less than 15%.NT  2 Pt will be independent with Antunez taping of right knee.met  3 Pt will report pain no greater than 3/10 with descending stairs at home.progressing    Plan  Therapy options: will not be seen for skilled therapy services  Planned modality interventions: cryotherapy, TENS, ultrasound and thermotherapy (hydrocollator packs)  Planned therapy interventions: ADL retraining, balance/weight-bearing training, body mechanics training, flexibility, functional ROM exercises, home exercise program, IADL retraining, joint mobilization, gait training, manual therapy, motor coordination training, neuromuscular re-education, postural training, soft tissue mobilization, strengthening, stretching and therapeutic activities  Treatment plan discussed with: patient  Plan details: Pt has requested to cont with HEP at this time and will be discharged.  Pt instructed to contact therapy as needed.         Recommendations: Discharge  Timeframe:   Prognosis to achieve goals:       Timed:         Manual Therapy:         mins  03551;     Therapeutic Exercise:    25    mins  82527;     Neuromuscular Rony:    15    mins  10141;    Therapeutic Activity:          mins  05165;     Gait Training:           mins  55031;     Ultrasound:          mins  53825;    Ionto                                   mins   10635  Self Care                            mins   55406    Un-Timed:  Electrical Stimulation:         mins  72342 ([facility] );  Dry Needling          mins self-pay  Traction          mins 02895  Re-Eval                               mins  15365  Canalith Repos         mins 88892    Timed Treatment:   40    mins   Total Treatment:     40   mins          PT: Huy Sutton PT     [place] License:  [id number]    Electronically signed by Huy Sutton PT, 03/01/24, 10:06 AM EST    Certification Period: 3/1/2024 thru 5/29/2024  I certify that the therapy services are furnished while this patient is under my care.  The services outlined above are required by this patient, and will be reviewed every 90 days.         Physician Signature:__________________________________________________    PHYSICIAN: Cruz Romeo PA  NPI: [id number]                                      DATE:  :     Please sign and return via fax to [email or web address] . Thank you, [facility]

## 2024-03-01 NOTE — TELEPHONE ENCOUNTER
Caller: IRINA LAWSON    Relationship to Patient: the patient    Phone Number: [phone] (home)     Reason for Call: MR. LAWSON CALLED IN INQUIRING ABOUT GETTING A NEW J BRACE SAYS IT KEEPS SLIDING DOWN NOW AT THE TIGHTEST SETTINGS. WOULD LIKE TO KNOW IF HE GETS A NEW J BRACE FROM US WILL HE BE CHARGED AND IF IT IS OK TO ORDER ONE ONLINE HIMSELF. IF SO WHAT DOES HE NEED TO ORDER EXACTLY     OK TO RESPOND VIA [employer]

## 2024-03-08 ENCOUNTER — OFFICE VISIT (OUTPATIENT)
Dept: ORTHOPEDIC SURGERY | Facility: CLINIC | Age: 39
End: 2024-03-08
Payer: COMMERCIAL

## 2024-03-08 VITALS — HEIGHT: 71 IN | WEIGHT: 315 LBS | BODY MASS INDEX: 44.1 KG/M2

## 2024-03-08 DIAGNOSIS — M25.561 PATELLOFEMORAL INSTABILITY OF RIGHT KNEE WITH PAIN: Primary | ICD-10-CM

## 2024-03-08 DIAGNOSIS — M25.561 RIGHT KNEE PAIN, UNSPECIFIED CHRONICITY: ICD-10-CM

## 2024-03-08 DIAGNOSIS — M25.361 PATELLOFEMORAL INSTABILITY OF RIGHT KNEE WITH PAIN: Primary | ICD-10-CM

## 2024-03-08 RX ORDER — NAPROXEN 500 MG/1
500 TABLET ORAL 2 TIMES DAILY WITH MEALS
Qty: 60 TABLET | Refills: 2 | Status: SHIPPED | OUTPATIENT
Start: 2024-03-08

## 2024-03-08 RX ADMIN — METHYLPREDNISOLONE ACETATE 80 MG: 80 INJECTION, SUSPENSION INTRA-ARTICULAR; INTRALESIONAL; INTRAMUSCULAR; SOFT TISSUE at 15:49

## 2024-03-08 RX ADMIN — LIDOCAINE HYDROCHLORIDE 5 ML: 10 INJECTION, SOLUTION EPIDURAL; INFILTRATION; INTRACAUDAL; PERINEURAL at 15:49

## 2024-03-08 NOTE — PROGRESS NOTES
[facility] [place] Visit        Patient: Grzegorz Gutierrez  YOB: 1985  Date of Encounter: 3/8/2024  PCP: Mary Kearney APRN      Subjective     Chief Complaint   Patient presents with    Right Knee - Pain, Follow-up           History of Present Illness:     Grzegorz Gutierrez is a 38 y.o. male presents today for follow-up of right knee.  Patient states that the NSAIDs have been helping to alleviate pain physical therapy has been beneficial however he still feels some instability.  He reports no other new complaints today.  Patient denies any paresthesias.  He has been using bracing with some benefit.  He reports that this is not 100% of the physical therapy seems to be helping however he still has popping and occasional swelling.      Patient Active Problem List   Diagnosis    Palpitation    Snoring    Morbid obesity    REGINO (obstructive sleep apnea)     Past Medical History:   Diagnosis Date    Disease of thyroid gland     Prediabetes      History reviewed. No pertinent surgical history.  Social History     Occupational History    Not on file   Tobacco Use    Smoking status: Never     Passive exposure: Never    Smokeless tobacco: Never   Vaping Use    Vaping status: Never Used   Substance and Sexual Activity    Alcohol use: Not Currently     Comment: rare    Drug use: Never    Sexual activity: Yes     Partners: Female    Grzegorz Gutierrez  reports that he has never smoked. He has never been exposed to tobacco smoke. He has never used smokeless tobacco.. I have educated him on the risk of diseases from using tobacco products such as cancer, COPD, and heart disease.        Social History     Social History Narrative    Caffeine:0-1  serving per day. None this week.     Family History   Problem Relation Age of Onset    Hypertension Mother     No Known Problems Father     No Known Problems Sister     No Known Problems Brother     Hypertension Maternal Grandmother      "Stroke Maternal Grandmother     Heart attack Paternal Grandmother      Current Outpatient Medications   Medication Sig Dispense Refill    levothyroxine (SYNTHROID, LEVOTHROID) 25 MCG tablet Take 1 tablet by mouth Daily.      naproxen (NAPROSYN) 500 MG tablet Take 1 tablet by mouth 2 (Two) Times a Day With Meals. 60 tablet 2    ondansetron ODT (ZOFRAN-ODT) 4 MG disintegrating tablet Place 1 tablet under the tongue Every 6 (Six) Hours As Needed for Nausea or Vomiting. 10 tablet 0    Tirzepatide (Mounjaro) 7.5 MG/0.5ML solution pen-injector Inject 7.5 mg under the skin into the appropriate area as directed 1 (One) Time Per Week.       No current facility-administered medications for this visit.     No Known Allergies         Review of Systems   Constitutional: Negative.   HENT: Negative.     Eyes: Negative.    Cardiovascular: Negative.    Respiratory: Negative.          Sleep apnea   Endocrine: Negative.    Hematologic/Lymphatic: Negative.    Skin: Negative.    Musculoskeletal:         Pertinent positives listed in HPI   Gastrointestinal: Negative.    Genitourinary: Negative.    Neurological: Negative.    Psychiatric/Behavioral: Negative.     Allergic/Immunologic: Negative.          Objective      Vitals:    03/08/24 0928   Weight: (!) 148 kg (326 lb 4.5 oz)   Height: 180.3 cm (70.98\")      Class 3 Severe Obesity (BMI >=40). Obesity-related health conditions include the following:  listed in PMH . Obesity is newly identified. BMI is is above average; BMI management plan is completed. We discussed portion control and increasing exercise.      Physical Exam  Vitals and nursing note reviewed.   Constitutional:       General: He is not in acute distress.     Appearance: Normal appearance. He is not ill-appearing.   HENT:      Head: Normocephalic and atraumatic.      Right Ear: External ear normal.      Left Ear: External ear normal.      Nose: Nose normal.      Mouth/Throat:      Mouth: Mucous membranes are moist.      " Pharynx: Oropharynx is clear.   Eyes:      Extraocular Movements: Extraocular movements intact.      Conjunctiva/sclera: Conjunctivae normal.      Pupils: Pupils are equal, round, and reactive to light.   Cardiovascular:      Rate and Rhythm: Normal rate.      Pulses: Normal pulses.   Pulmonary:      Effort: Pulmonary effort is normal.   Abdominal:      General: There is no distension.   Musculoskeletal:      Cervical back: Normal range of motion. No rigidity.      Comments: Right knee on examination today reveals patellofemoral crepitus.  Patient has positive J sign with VMO atrophy.  Lateral patella tilt with full flexion extension no pain or instability upon varus/valgus stress.  Lachman and drawer testing negative.  Neurovascular status grossly intact right lower extremity.       Skin:     General: Skin is warm and dry.      Capillary Refill: Capillary refill takes less than 2 seconds.   Neurological:      General: No focal deficit present.      Mental Status: He is alert and oriented to person, place, and time.   Psychiatric:         Mood and Affect: Mood normal.         Behavior: Behavior normal.                 Radiology:      XR Knee 3 View Right    Result Date: 12/22/2023    Lateralization of patella from expected location within patellar groove.   This report was finalized on 12/22/2023 9:17 AM by Dr. Urbano Murdock MD.             Assessment/Plan        ICD-10-CM ICD-9-CM   1. Patellofemoral instability of right knee with pain  M25.361 718.86    M25.561 719.46   2. Right knee pain, unspecified chronicity  M25.561 719.46       38-year-old male with notable progressive right patellofemoral pain syndrome with maltracking and lateralization of the patella.  The patient has been doing well with the bracing as well as physical therapy and the medication however he still has some pain and symptoms with intermittent swelling.  As result of this patient was provided today with a intra-articular steroid injection  right knee 80 mg Depo-Medrol with lidocaine block injected into the intra-articular space of the right knee.  Patient tolerated this procedure well.  He was instructed to return back in 4 weeks for further evaluation of the efficacy of the conservative treatment.        Large Joint Arthrocentesis: R knee  Date/Time: 3/8/2024 3:49 PM  Consent given by: patient  Site marked: site marked  Supporting Documentation  Indications: pain and diagnostic evaluation   Procedure Details  Location: knee - R knee  Needle size: 25 G  Approach: anterolateral  Medications administered: 80 mg methylPREDNISolone acetate 80 MG/ML; 5 mL lidocaine PF 1% 1 %  Patient tolerance: patient tolerated the procedure well with no immediate complications                      This document was signed by Cruz Romeo PA-C March 8, 2024    CC: Mary Kearney APRN      Dictated Utilizing Dragon Dictation:   Please note that portions of this note were completed with a voice recognition program.   Part of this note may be an electronic transcription/translation of spoken language to printed text using the Dragon Dictation System.

## 2024-03-27 RX ORDER — LIDOCAINE HYDROCHLORIDE 10 MG/ML
5 INJECTION, SOLUTION EPIDURAL; INFILTRATION; INTRACAUDAL; PERINEURAL
Status: COMPLETED | OUTPATIENT
Start: 2024-03-08 | End: 2024-03-08

## 2024-03-27 RX ORDER — METHYLPREDNISOLONE ACETATE 80 MG/ML
80 INJECTION, SUSPENSION INTRA-ARTICULAR; INTRALESIONAL; INTRAMUSCULAR; SOFT TISSUE
Status: COMPLETED | OUTPATIENT
Start: 2024-03-08 | End: 2024-03-08

## 2024-03-28 ENCOUNTER — OFFICE VISIT (OUTPATIENT)
Dept: ORTHOPEDIC SURGERY | Facility: CLINIC | Age: 39
End: 2024-03-28
Payer: COMMERCIAL

## 2024-03-28 VITALS — WEIGHT: 315 LBS | HEIGHT: 71 IN | BODY MASS INDEX: 44.1 KG/M2

## 2024-03-28 DIAGNOSIS — M25.561 RIGHT KNEE PAIN, UNSPECIFIED CHRONICITY: ICD-10-CM

## 2024-03-28 DIAGNOSIS — M25.361 PATELLOFEMORAL INSTABILITY OF RIGHT KNEE WITH PAIN: Primary | ICD-10-CM

## 2024-03-28 DIAGNOSIS — M25.561 PATELLOFEMORAL INSTABILITY OF RIGHT KNEE WITH PAIN: Primary | ICD-10-CM

## 2024-03-28 PROCEDURE — 99213 OFFICE O/P EST LOW 20 MIN: CPT | Performed by: PHYSICIAN ASSISTANT

## 2024-04-15 NOTE — PROGRESS NOTES
[facility] [place] Visit        Patient: Grzegorz Gutierrez  YOB: 1985  Date of Encounter: 3/28/2024  PCP: Mary Kearney APRN      Subjective     Chief Complaint   Patient presents with    Right Knee - Follow-up           History of Present Illness:     Grzegorz Gutierrez is a 38 y.o. male presents today for follow-up of right knee.  Patient states that the NSAIDs have been helping to alleviate pain physical therapy has been beneficial.  Patient reports near complete resolution with the most recent intra-articular steroid injection.  Patient states that he stays relatively stable and no pain morning questioning today.  The patient's only concern is sometimes weakness or stability of the kneecap and he has questions in regards to KT taping with upcoming [place] trip.  He reports no other new complaints.  Denies any paresthesias.       Patient Active Problem List   Diagnosis    Palpitation    Snoring    Morbid obesity    REGINO (obstructive sleep apnea)     Past Medical History:   Diagnosis Date    Disease of thyroid gland     Prediabetes      No past surgical history on file.  Social History     Occupational History    Not on file   Tobacco Use    Smoking status: Never     Passive exposure: Never    Smokeless tobacco: Never   Vaping Use    Vaping status: Never Used   Substance and Sexual Activity    Alcohol use: Not Currently     Comment: rare    Drug use: Never    Sexual activity: Yes     Partners: Female    Grzegorz Gutierrez  reports that he has never smoked. He has never been exposed to tobacco smoke. He has never used smokeless tobacco.. I have educated him on the risk of diseases from using tobacco products such as cancer, COPD, and heart disease.        Social History     Social History Narrative    Caffeine:0-1  serving per day. None this week.     Family History   Problem Relation Age of Onset    Hypertension Mother     No Known Problems Father     No Known Problems  "Sister     No Known Problems Brother     Hypertension Maternal Grandmother     Stroke Maternal Grandmother     Heart attack Paternal Grandmother      Current Outpatient Medications   Medication Sig Dispense Refill    levothyroxine (SYNTHROID, LEVOTHROID) 25 MCG tablet Take 1 tablet by mouth Daily.      naproxen (NAPROSYN) 500 MG tablet Take 1 tablet by mouth 2 (Two) Times a Day With Meals. 60 tablet 2    ondansetron ODT (ZOFRAN-ODT) 4 MG disintegrating tablet Place 1 tablet under the tongue Every 6 (Six) Hours As Needed for Nausea or Vomiting. 10 tablet 0    Tirzepatide (Mounjaro) 7.5 MG/0.5ML solution pen-injector Inject 7.5 mg under the skin into the appropriate area as directed 1 (One) Time Per Week.       No current facility-administered medications for this visit.     No Known Allergies         Review of Systems   Constitutional: Negative.   HENT: Negative.     Eyes: Negative.    Cardiovascular: Negative.    Respiratory: Negative.          Sleep apnea   Endocrine: Negative.    Hematologic/Lymphatic: Negative.    Skin: Negative.    Musculoskeletal:         Pertinent positives listed in HPI   Gastrointestinal: Negative.    Genitourinary: Negative.    Neurological: Negative.    Psychiatric/Behavioral: Negative.     Allergic/Immunologic: Negative.          Objective      Vitals:    03/28/24 1113   Weight: (!) 147 kg (325 lb)   Height: 180.3 cm (71\")   PainSc: 0-No pain      Class 3 Severe Obesity (BMI >=40). Obesity-related health conditions include the following:  listed in PMH . Obesity is newly identified. BMI is is above average; BMI management plan is completed. We discussed portion control and increasing exercise.      Physical Exam  Vitals and nursing note reviewed.   Constitutional:       General: He is not in acute distress.     Appearance: Normal appearance. He is not ill-appearing.   HENT:      Head: Normocephalic and atraumatic.      Right Ear: External ear normal.      Left Ear: External ear normal.    "   Nose: Nose normal.      Mouth/Throat:      Mouth: Mucous membranes are moist.      Pharynx: Oropharynx is clear.   Eyes:      Extraocular Movements: Extraocular movements intact.      Conjunctiva/sclera: Conjunctivae normal.      Pupils: Pupils are equal, round, and reactive to light.   Cardiovascular:      Rate and Rhythm: Normal rate.      Pulses: Normal pulses.   Pulmonary:      Effort: Pulmonary effort is normal.   Abdominal:      General: There is no distension.   Musculoskeletal:      Cervical back: Normal range of motion. No rigidity.      Comments: Right knee on examination today reveals patellofemoral crepitus.  Patient has positive J sign with VMO atrophy.  Lateral patella tilt with full flexion extension no pain or instability upon varus/valgus stress.  Lachman and drawer testing negative.  Neurovascular status grossly intact right lower extremity.       Skin:     General: Skin is warm and dry.      Capillary Refill: Capillary refill takes less than 2 seconds.   Neurological:      General: No focal deficit present.      Mental Status: He is alert and oriented to person, place, and time.   Psychiatric:         Mood and Affect: Mood normal.         Behavior: Behavior normal.                 Radiology:      No radiology results for the last 90 days.          Assessment/Plan        ICD-10-CM ICD-9-CM   1. Patellofemoral instability of right knee with pain  M25.361 718.86    M25.561 719.46   2. Right knee pain, unspecified chronicity  M25.561 719.46       38-year-old male with notable progressive right patellofemoral pain syndrome and maltracking with lateralization of patella.  Patient is seeing continued improvement with the most recent therapy as well as taping and previous intra-articular steroid injection.  Currently patient has no pain and further discussion was had with the patient in reference to KT taping he may continue this during periods of activity most notably long ambulation while on vacation.   The patient was instructed to return back on an as-needed basis upon any further complication or worsening of pain/symptoms.              This document was signed by Cruz Romeo PA-C March 28, 2024    CC: Mary Kearney APRN      Dictated Utilizing Dragon Dictation:   Please note that portions of this note were completed with a voice recognition program.   Part of this note may be an electronic transcription/translation of spoken language to printed text using the Dragon Dictation System.

## 2024-08-09 ENCOUNTER — OFFICE VISIT (OUTPATIENT)
Dept: UROLOGY | Facility: CLINIC | Age: 39
End: 2024-08-09
Payer: COMMERCIAL

## 2024-08-09 VITALS
BODY MASS INDEX: 44.1 KG/M2 | SYSTOLIC BLOOD PRESSURE: 125 MMHG | DIASTOLIC BLOOD PRESSURE: 85 MMHG | WEIGHT: 315 LBS | HEART RATE: 82 BPM | HEIGHT: 71 IN

## 2024-08-09 DIAGNOSIS — Z30.09 VASECTOMY EVALUATION: Primary | ICD-10-CM

## 2024-08-09 RX ORDER — AMOXICILLIN AND CLAVULANATE POTASSIUM 875; 125 MG/1; MG/1
1 TABLET, FILM COATED ORAL EVERY 12 HOURS SCHEDULED
COMMUNITY
Start: 2024-08-04

## 2024-08-09 NOTE — PROGRESS NOTES
"Chief Complaint:    Chief Complaint   Patient presents with    vasectomy consult        Vital Signs:   /85   Pulse 82   Ht 180.3 cm (70.98\")   Wt (!) 148 kg (325 lb 9.6 oz)   BMI 45.43 kg/m²   Body mass index is 45.43 kg/m².      HPI:  Grzegorz Gutierrez is a 38 y.o. male who presents today for initial evaluation     History of Present Illness  Mr. Gutierrez presents to the clinic for evaluation of an elective scrotal vasectomy.  He has a past medical history significant for hypothyroidism and concerns of prediabetic.  He has fathered 1 child in the past.  Both he and his current partner are in agreements to proceed forward with elective scrotal vasectomy.  He denies any testicular pain, testicular swelling, gross hematuria, hematospermia, testicular masses, or other lower urinary tract symptoms.  Physical exam today reveals a normal right testicle and vas deferens.  Left testicle is high rising with difficulty differentiating vas deferens.  Given anatomy and recommended to undergo procedure in the OR.  Will get him scheduled for September 20.      Past Medical History:  Past Medical History:   Diagnosis Date    Disease of thyroid gland     Prediabetes        Current Meds:  Current Outpatient Medications   Medication Sig Dispense Refill    amoxicillin-clavulanate (AUGMENTIN) 875-125 MG per tablet Take 1 tablet by mouth Every 12 (Twelve) Hours.      levothyroxine (SYNTHROID, LEVOTHROID) 25 MCG tablet Take 1 tablet by mouth Daily.      naproxen (NAPROSYN) 500 MG tablet Take 1 tablet by mouth 2 (Two) Times a Day With Meals. 60 tablet 2     No current facility-administered medications for this visit.        Allergies:   No Known Allergies     Past Surgical History:  History reviewed. No pertinent surgical history.    Social History:  Social History     Socioeconomic History    Marital status:    Tobacco Use    Smoking status: Never     Passive exposure: Never    Smokeless tobacco: Never   Vaping Use    " Vaping status: Never Used   Substance and Sexual Activity    Alcohol use: Not Currently     Comment: rare    Drug use: Never    Sexual activity: Yes     Partners: Female       Family History:  Family History   Problem Relation Age of Onset    Hypertension Mother     No Known Problems Father     No Known Problems Sister     No Known Problems Brother     Hypertension Maternal Grandmother     Stroke Maternal Grandmother     Heart attack Paternal Grandmother        Review of Systems:  Review of Systems   Constitutional:  Negative for fatigue, fever and unexpected weight change.   Respiratory:  Negative for chest tightness and shortness of breath.    Cardiovascular:  Negative for chest pain.   Gastrointestinal:  Negative for abdominal pain, constipation, diarrhea, nausea and vomiting.   Genitourinary:  Negative for decreased urine volume, difficulty urinating, dysuria, enuresis, flank pain, frequency, genital sores, hematuria, penile discharge, penile pain, penile swelling, scrotal swelling, testicular pain and urgency.   Skin:  Negative for rash.   Psychiatric/Behavioral:  Negative for confusion and suicidal ideas.        Physical Exam:  Physical Exam  Constitutional:       General: He is not in acute distress.     Appearance: Normal appearance.      Comments: Current BMI of 45   HENT:      Head: Normocephalic and atraumatic.      Nose: Nose normal.      Mouth/Throat:      Mouth: Mucous membranes are moist.   Eyes:      Conjunctiva/sclera: Conjunctivae normal.   Cardiovascular:      Rate and Rhythm: Normal rate and regular rhythm.      Pulses: Normal pulses.      Heart sounds: Normal heart sounds.   Pulmonary:      Effort: Pulmonary effort is normal.      Breath sounds: Normal breath sounds.   Abdominal:      General: Bowel sounds are normal.      Palpations: Abdomen is soft.   Musculoskeletal:         General: Normal range of motion.      Cervical back: Normal range of motion.   Skin:     General: Skin is warm.    Neurological:      General: No focal deficit present.      Mental Status: He is alert and oriented to person, place, and time.   Psychiatric:         Mood and Affect: Mood normal.         Behavior: Behavior normal.         Thought Content: Thought content normal.         Judgment: Judgment normal.       Recent Image (CT and/or KUB):   CT Abdomen and Pelvis: No results found for this or any previous visit.     CT Stone Protocol: No results found for this or any previous visit.     KUB: No results found for this or any previous visit.       Labs:  Brief Urine Lab Results       None          No visits with results within 3 Month(s) from this visit.   Latest known visit with results is:   Lab on 06/28/2023   Component Date Value Ref Range Status    TSH 06/28/2023 4.160  0.270 - 4.200 uIU/mL Final        Procedure: None  Procedures     I have reviewed and agree with the above PMH, PSH, FMH, social history, medications, allergies, and labs.     Assessment/Plan:   Problem List Items Addressed This Visit       Vasectomy evaluation - Primary    Relevant Orders    Case Request (Completed)       Health Maintenance:   Health Maintenance Due   Topic Date Due    TDAP/TD VACCINES (2 - Tdap) 05/25/2009    HEPATITIS C SCREENING  Never done    ANNUAL PHYSICAL  Never done    COVID-19 Vaccine (4 - 2023-24 season) 09/01/2023    INFLUENZA VACCINE  08/01/2024        Smoking Counseling: Never smoked or use smokeless tobacco.  Counseling given.    Urine Incontinence: Patient reports that he is not currently experiencing any symptoms of urinary incontinence.    Patient was given instructions and counseling regarding his condition or for health maintenance advice. Please see specific information pulled into the AVS if appropriate.    Patient Education:   Mr. Gutierrez presents for consideration of an elective scrotal vasectomy.  I discussed the procedure at length including the risks of local anesthesia, bleeding, infection, testicular pain  postoperatively, and a very low chance of long-term testicular pain.  He was informed of the rates of surgical complications such as a symptomatic hematoma and infection in the range of 1 to 2% and clinically from the risk of chronic significant scrotal pain in the range of 1 to 2%.  The patient was informed of a possible failure rate in the range of 1 in 10,000 and the important necessity to have a follow-up in about 8 weeks after the original procedure to be sure that the semen specimen is free of spermatozoa, thus ensuring sterility.  I discussed the various techniques out there including a 1 incision, 2 incision technique as well.  They understand that we will be giving local anesthesia and a mild antibiotic to take in the immediate perioperative period.  If he cannot tolerate the anesthesia for a variety of reasons including body habitus., we are glad to perform it under an anesthetic.  We discussed the technique of reversal when indicated including the approximate rate of 57% and the importance that this is strongly related to the time from the original vasectomy.  However, I stressed the fact that if they are even considering children in the future they should not use this as a form of temporary contraception. Patients can stop contraception once postvasectomy semen specimens show azoospermia or only rare nonmotile spermatozoa in the range of less than 100,000 sperm per mLnts.  Given patient's difficult anatomy and BMI 45 and recommend undergo procedure in the OR.  Will get him scheduled with Dr. Johnson on 9/20/2024.    Visit Diagnoses:    ICD-10-CM ICD-9-CM   1. Vasectomy evaluation  Z30.09 V25.09       Meds Ordered During Visit:  No orders of the defined types were placed in this encounter.      Follow Up Appointment: Elective scrotal vasectomy in the OR  No follow-ups on file.      This document has been electronically signed by Devang Cramer PA-C   August 9, 2024 10:48 EDT    Part of this note may be  an electronic transcription/translation of spoken language to printed text using the Dragon Dictation System.

## 2024-09-17 NOTE — DISCHARGE INSTRUCTIONS
Please discontinue all blood thinners and anticoagulants (except aspirin) prior to surgery as per your surgeon and cardiologist instructions.  Aspirin may be continued up to the day prior to surgery.    HOLD all diabetic medications the morning of surgery as order by physician.    Please follow instructions on use of prep cloths provided by nurse. Return instruction sheet to pre-op nurse on day of surgery.    Arrival time for surgery on 9/20/24  will be given to you by Dr. Johnson's  Office.    A RESPONSIBLE PERSON MUST REMAIN IN THE WAITING ROOM DURING YOUR PROCEDURE AND A RESPONSIBLE  MUST BE AVAILABLE UPON YOUR DISCHARGE.    General Instructions:  Do NOT eat or drink after midnight 9/19/24 which includes water, mints, or gum.  You may brush your teeth. Dental appliances that are removable must be taken out day of surgery.  Do NOT smoke, chew tobacco, or drink alcohol within 24 hours prior to surgery.  Bring medications in original bottles, any inhalers and if applicable your C-PAP/BI-PAP machine  Bring any papers given to you in the doctor’s office  Wear clean, comfortable clothes and socks  Do NOT wear contact lenses or make-up or dark nail polish.  Bring a case for your glasses if applicable.  Bring crutches or walker if applicable  Leave all other valuables and jewelry at home  If you were given a blood bank armband, continue to wear it until discharged.    Preventing a Surgical Site Infection:  Shower the night before surgery (unless instructed otherwise) using a fresh bar of anti-bacterial soap (such as Dial) and clean washcloth.  Dry with a clean towel and dress in clean clothing.  For 2 to 3 days before surgery, avoid shaving with a razor near where you will have surgery because the razor can irritate skin and make it easier to develop an infection.  Ask your surgeon if you will be receiving antibiotics prior to surgery.  Make sure you, your family, and all healthcare providers clean their hands with  soap and water or an alcohol-based hand  before caring for you or your wound.  If at all possible, quit smoking as many days before surgery as you can.    Day of Surgery:  Upon arrival, a pre-op nurse and anesthesiologist will review your health history, obtain vital signs, and answer questions you may have.  The only belongings needed at this time will be your home medications and if applicable you C-PAP/BI-PAP machine.  If you are staying overnight, your family can leave the rest of your belongings in the car and bring them to your room later.  A pre-op nurse will start an IV and you may receive medication in preparation for surgery.  Due to patient privacy and limited space, only one member of your family will be able to accompany you in the pre-op area.  While you are in surgery your family should notify the waiting room  if they leave the waiting room area and provide a contact number.  Please be aware that surgery does come with discomfort.  We want to make every effort to control your discomfort so please discuss any uncontrolled symptoms with your nurse.  Your doctor will most likely have prescribed pain medications.  If you are going home after surgery you will receive individualized written care instructions before being discharged.  A responsible adult must drive you to and from the hospital on the day of surgery and stay with you for 24 hours.  If you are staying overnight following surgery, you will be transported to your hospital room following the recovery period.

## 2024-09-18 ENCOUNTER — PRE-ADMISSION TESTING (OUTPATIENT)
Dept: PREADMISSION TESTING | Facility: HOSPITAL | Age: 39
End: 2024-09-18
Payer: COMMERCIAL

## 2024-09-18 LAB
ANION GAP SERPL CALCULATED.3IONS-SCNC: 8.6 MMOL/L (ref 5–15)
BUN SERPL-MCNC: 13 MG/DL (ref 6–20)
BUN/CREAT SERPL: 16 (ref 7–25)
CALCIUM SPEC-SCNC: 9.6 MG/DL (ref 8.6–10.5)
CHLORIDE SERPL-SCNC: 102 MMOL/L (ref 98–107)
CO2 SERPL-SCNC: 27.4 MMOL/L (ref 22–29)
CREAT SERPL-MCNC: 0.81 MG/DL (ref 0.76–1.27)
DEPRECATED RDW RBC AUTO: 40.7 FL (ref 37–54)
EGFRCR SERPLBLD CKD-EPI 2021: 114.3 ML/MIN/1.73
ERYTHROCYTE [DISTWIDTH] IN BLOOD BY AUTOMATED COUNT: 13.9 % (ref 12.3–15.4)
GLUCOSE SERPL-MCNC: 108 MG/DL (ref 65–99)
HCT VFR BLD AUTO: 47.2 % (ref 37.5–51)
HGB BLD-MCNC: 15.7 G/DL (ref 13–17.7)
MCH RBC QN AUTO: 27.3 PG (ref 26.6–33)
MCHC RBC AUTO-ENTMCNC: 33.3 G/DL (ref 31.5–35.7)
MCV RBC AUTO: 81.9 FL (ref 79–97)
PLATELET # BLD AUTO: 248 10*3/MM3 (ref 140–450)
PMV BLD AUTO: 10.1 FL (ref 6–12)
POTASSIUM SERPL-SCNC: 4.1 MMOL/L (ref 3.5–5.2)
RBC # BLD AUTO: 5.76 10*6/MM3 (ref 4.14–5.8)
SODIUM SERPL-SCNC: 138 MMOL/L (ref 136–145)
WBC NRBC COR # BLD AUTO: 6.99 10*3/MM3 (ref 3.4–10.8)

## 2024-09-18 PROCEDURE — 85027 COMPLETE CBC AUTOMATED: CPT

## 2024-09-18 PROCEDURE — 36415 COLL VENOUS BLD VENIPUNCTURE: CPT

## 2024-09-18 PROCEDURE — 80048 BASIC METABOLIC PNL TOTAL CA: CPT

## 2024-09-18 RX ORDER — ERGOCALCIFEROL 1.25 MG/1
50000 CAPSULE, LIQUID FILLED ORAL
COMMUNITY
Start: 2024-08-19

## 2024-09-20 ENCOUNTER — ANESTHESIA EVENT (OUTPATIENT)
Dept: PERIOP | Facility: HOSPITAL | Age: 39
End: 2024-09-20
Payer: COMMERCIAL

## 2024-09-20 ENCOUNTER — ANESTHESIA (OUTPATIENT)
Dept: PERIOP | Facility: HOSPITAL | Age: 39
End: 2024-09-20
Payer: COMMERCIAL

## 2024-09-20 ENCOUNTER — HOSPITAL ENCOUNTER (OUTPATIENT)
Facility: HOSPITAL | Age: 39
Setting detail: HOSPITAL OUTPATIENT SURGERY
Discharge: HOME | End: 2024-09-20
Attending: UROLOGY | Admitting: UROLOGY
Payer: COMMERCIAL

## 2024-09-20 VITALS
HEIGHT: 70 IN | BODY MASS INDEX: 45.1 KG/M2 | TEMPERATURE: 98.1 F | HEART RATE: 75 BPM | SYSTOLIC BLOOD PRESSURE: 132 MMHG | OXYGEN SATURATION: 98 % | RESPIRATION RATE: 18 BRPM | WEIGHT: 315 LBS | DIASTOLIC BLOOD PRESSURE: 78 MMHG

## 2024-09-20 DIAGNOSIS — Z30.09 VASECTOMY EVALUATION: ICD-10-CM

## 2024-09-20 PROCEDURE — 25010000002 FENTANYL CITRATE (PF) 50 MCG/ML SOLUTION: Performed by: NURSE ANESTHETIST, CERTIFIED REGISTERED

## 2024-09-20 PROCEDURE — 25010000002 ONDANSETRON PER 1 MG: Performed by: NURSE ANESTHETIST, CERTIFIED REGISTERED

## 2024-09-20 PROCEDURE — 25010000002 PROPOFOL 200 MG/20ML EMULSION: Performed by: NURSE ANESTHETIST, CERTIFIED REGISTERED

## 2024-09-20 PROCEDURE — S0260 H&P FOR SURGERY: HCPCS | Performed by: UROLOGY

## 2024-09-20 PROCEDURE — 25810000003 LACTATED RINGERS PER 1000 ML: Performed by: ANESTHESIOLOGY

## 2024-09-20 PROCEDURE — 25010000002 MIDAZOLAM PER 1 MG: Performed by: NURSE ANESTHETIST, CERTIFIED REGISTERED

## 2024-09-20 PROCEDURE — 25010000002 GENTAMICIN PER 80 MG: Performed by: UROLOGY

## 2024-09-20 PROCEDURE — 55250 REMOVAL OF SPERM DUCT(S): CPT | Performed by: UROLOGY

## 2024-09-20 RX ORDER — SODIUM CHLORIDE 0.9 % (FLUSH) 0.9 %
10 SYRINGE (ML) INJECTION AS NEEDED
Status: DISCONTINUED | OUTPATIENT
Start: 2024-09-20 | End: 2024-09-20 | Stop reason: HOSPADM

## 2024-09-20 RX ORDER — IPRATROPIUM BROMIDE AND ALBUTEROL SULFATE 2.5; .5 MG/3ML; MG/3ML
3 SOLUTION RESPIRATORY (INHALATION) ONCE AS NEEDED
Status: DISCONTINUED | OUTPATIENT
Start: 2024-09-20 | End: 2024-09-20 | Stop reason: HOSPADM

## 2024-09-20 RX ORDER — FAMOTIDINE 10 MG/ML
INJECTION, SOLUTION INTRAVENOUS AS NEEDED
Status: DISCONTINUED | OUTPATIENT
Start: 2024-09-20 | End: 2024-09-20 | Stop reason: SURG

## 2024-09-20 RX ORDER — ONDANSETRON 2 MG/ML
4 INJECTION INTRAMUSCULAR; INTRAVENOUS AS NEEDED
Status: DISCONTINUED | OUTPATIENT
Start: 2024-09-20 | End: 2024-09-20 | Stop reason: HOSPADM

## 2024-09-20 RX ORDER — FENTANYL CITRATE 50 UG/ML
INJECTION, SOLUTION INTRAMUSCULAR; INTRAVENOUS AS NEEDED
Status: DISCONTINUED | OUTPATIENT
Start: 2024-09-20 | End: 2024-09-20 | Stop reason: SURG

## 2024-09-20 RX ORDER — SODIUM CHLORIDE, SODIUM LACTATE, POTASSIUM CHLORIDE, CALCIUM CHLORIDE 600; 310; 30; 20 MG/100ML; MG/100ML; MG/100ML; MG/100ML
100 INJECTION, SOLUTION INTRAVENOUS ONCE AS NEEDED
Status: DISCONTINUED | OUTPATIENT
Start: 2024-09-20 | End: 2024-09-20 | Stop reason: HOSPADM

## 2024-09-20 RX ORDER — LIDOCAINE HYDROCHLORIDE 20 MG/ML
INJECTION, SOLUTION EPIDURAL; INFILTRATION; INTRACAUDAL; PERINEURAL AS NEEDED
Status: DISCONTINUED | OUTPATIENT
Start: 2024-09-20 | End: 2024-09-20 | Stop reason: SURG

## 2024-09-20 RX ORDER — ONDANSETRON 2 MG/ML
INJECTION INTRAMUSCULAR; INTRAVENOUS AS NEEDED
Status: DISCONTINUED | OUTPATIENT
Start: 2024-09-20 | End: 2024-09-20 | Stop reason: SURG

## 2024-09-20 RX ORDER — GENTAMICIN SULFATE 80 MG/100ML
80 INJECTION, SOLUTION INTRAVENOUS ONCE
Status: COMPLETED | OUTPATIENT
Start: 2024-09-20 | End: 2024-09-20

## 2024-09-20 RX ORDER — FENTANYL CITRATE 50 UG/ML
50 INJECTION, SOLUTION INTRAMUSCULAR; INTRAVENOUS
Status: DISCONTINUED | OUTPATIENT
Start: 2024-09-20 | End: 2024-09-20 | Stop reason: HOSPADM

## 2024-09-20 RX ORDER — MIDAZOLAM HYDROCHLORIDE 1 MG/ML
1 INJECTION INTRAMUSCULAR; INTRAVENOUS
Status: DISCONTINUED | OUTPATIENT
Start: 2024-09-20 | End: 2024-09-20 | Stop reason: HOSPADM

## 2024-09-20 RX ORDER — SODIUM CHLORIDE, SODIUM LACTATE, POTASSIUM CHLORIDE, CALCIUM CHLORIDE 600; 310; 30; 20 MG/100ML; MG/100ML; MG/100ML; MG/100ML
125 INJECTION, SOLUTION INTRAVENOUS ONCE
Status: COMPLETED | OUTPATIENT
Start: 2024-09-20 | End: 2024-09-20

## 2024-09-20 RX ORDER — OXYCODONE AND ACETAMINOPHEN 5; 325 MG/1; MG/1
1 TABLET ORAL ONCE AS NEEDED
Status: COMPLETED | OUTPATIENT
Start: 2024-09-20 | End: 2024-09-20

## 2024-09-20 RX ORDER — LIDOCAINE HYDROCHLORIDE AND EPINEPHRINE BITARTRATE 20; .01 MG/ML; MG/ML
INJECTION, SOLUTION SUBCUTANEOUS AS NEEDED
Status: DISCONTINUED | OUTPATIENT
Start: 2024-09-20 | End: 2024-09-20 | Stop reason: HOSPADM

## 2024-09-20 RX ORDER — KETOROLAC TROMETHAMINE 30 MG/ML
30 INJECTION, SOLUTION INTRAMUSCULAR; INTRAVENOUS EVERY 6 HOURS PRN
Status: DISCONTINUED | OUTPATIENT
Start: 2024-09-20 | End: 2024-09-20 | Stop reason: HOSPADM

## 2024-09-20 RX ORDER — SODIUM CHLORIDE 0.9 % (FLUSH) 0.9 %
10 SYRINGE (ML) INJECTION EVERY 12 HOURS SCHEDULED
Status: DISCONTINUED | OUTPATIENT
Start: 2024-09-20 | End: 2024-09-20 | Stop reason: HOSPADM

## 2024-09-20 RX ORDER — MEPERIDINE HYDROCHLORIDE 25 MG/ML
12.5 INJECTION INTRAMUSCULAR; INTRAVENOUS; SUBCUTANEOUS
Status: DISCONTINUED | OUTPATIENT
Start: 2024-09-20 | End: 2024-09-20 | Stop reason: HOSPADM

## 2024-09-20 RX ORDER — HYDROCODONE BITARTRATE AND ACETAMINOPHEN 10; 325 MG/1; MG/1
1 TABLET ORAL EVERY 6 HOURS PRN
Qty: 12 TABLET | Refills: 0 | Status: SHIPPED | OUTPATIENT
Start: 2024-09-20

## 2024-09-20 RX ORDER — SODIUM CHLORIDE 9 MG/ML
40 INJECTION, SOLUTION INTRAVENOUS AS NEEDED
Status: DISCONTINUED | OUTPATIENT
Start: 2024-09-20 | End: 2024-09-20 | Stop reason: HOSPADM

## 2024-09-20 RX ORDER — MIDAZOLAM HYDROCHLORIDE 1 MG/ML
INJECTION INTRAMUSCULAR; INTRAVENOUS AS NEEDED
Status: DISCONTINUED | OUTPATIENT
Start: 2024-09-20 | End: 2024-09-20 | Stop reason: SURG

## 2024-09-20 RX ORDER — PROPOFOL 10 MG/ML
INJECTION, EMULSION INTRAVENOUS AS NEEDED
Status: DISCONTINUED | OUTPATIENT
Start: 2024-09-20 | End: 2024-09-20 | Stop reason: SURG

## 2024-09-20 RX ADMIN — FAMOTIDINE 20 MG: 10 INJECTION, SOLUTION INTRAVENOUS at 07:22

## 2024-09-20 RX ADMIN — MIDAZOLAM HYDROCHLORIDE 2 MG: 1 INJECTION, SOLUTION INTRAMUSCULAR; INTRAVENOUS at 07:22

## 2024-09-20 RX ADMIN — FENTANYL CITRATE 100 MCG: 50 INJECTION INTRAMUSCULAR; INTRAVENOUS at 07:22

## 2024-09-20 RX ADMIN — ONDANSETRON 4 MG: 2 INJECTION INTRAMUSCULAR; INTRAVENOUS at 07:29

## 2024-09-20 RX ADMIN — OXYCODONE HYDROCHLORIDE AND ACETAMINOPHEN 1 TABLET: 5; 325 TABLET ORAL at 08:13

## 2024-09-20 RX ADMIN — SODIUM CHLORIDE, POTASSIUM CHLORIDE, SODIUM LACTATE AND CALCIUM CHLORIDE: 600; 310; 30; 20 INJECTION, SOLUTION INTRAVENOUS at 07:22

## 2024-09-20 RX ADMIN — FENTANYL CITRATE 50 MCG: 50 INJECTION, SOLUTION INTRAMUSCULAR; INTRAVENOUS at 08:12

## 2024-09-20 RX ADMIN — LIDOCAINE HYDROCHLORIDE 60 MG: 20 INJECTION, SOLUTION EPIDURAL; INFILTRATION; INTRACAUDAL; PERINEURAL at 07:26

## 2024-09-20 RX ADMIN — GENTAMICIN SULFATE 80 MG: 80 INJECTION, SOLUTION INTRAVENOUS at 07:22

## 2024-09-20 RX ADMIN — PROPOFOL 200 MG: 10 INJECTION, EMULSION INTRAVENOUS at 07:26

## 2024-09-20 NOTE — H&P
"Chief Complaint   Patient presents with    vasectomy consult          Vital Signs:   /85   Pulse 82   Ht 180.3 cm (70.98\")   Wt (!) 148 kg (325 lb 9.6 oz)   BMI 45.43 kg/m²   Body mass index is 45.43 kg/m².       HPI:  Grzegorz Gutierrez is a 38 y.o. male who presents today for initial evaluation      History of Present Illness  Mr. Gutierrez presents to the clinic for evaluation of an elective scrotal vasectomy.  He has a past medical history significant for hypothyroidism and concerns of prediabetic.  He has fathered 1 child in the past.  Both he and his current partner are in agreements to proceed forward with elective scrotal vasectomy.  He denies any testicular pain, testicular swelling, gross hematuria, hematospermia, testicular masses, or other lower urinary tract symptoms.  Physical exam today reveals a normal right testicle and vas deferens.  Left testicle is high rising with difficulty differentiating vas deferens.  Given anatomy and recommended to undergo procedure in the OR.  Will get him scheduled for September 20.        Past Medical History:  Medical History        Past Medical History:   Diagnosis Date    Disease of thyroid gland      Prediabetes              Current Meds:  Current Medications          Current Outpatient Medications   Medication Sig Dispense Refill    amoxicillin-clavulanate (AUGMENTIN) 875-125 MG per tablet Take 1 tablet by mouth Every 12 (Twelve) Hours.        levothyroxine (SYNTHROID, LEVOTHROID) 25 MCG tablet Take 1 tablet by mouth Daily.        naproxen (NAPROSYN) 500 MG tablet Take 1 tablet by mouth 2 (Two) Times a Day With Meals. 60 tablet 2      No current facility-administered medications for this visit.            Allergies:   Allergies   No Known Allergies        Past Surgical History:  Surgical History   History reviewed. No pertinent surgical history.        Social History:  Social History   Social History            Socioeconomic History    Marital status: "    Tobacco Use    Smoking status: Never       Passive exposure: Never    Smokeless tobacco: Never   Vaping Use    Vaping status: Never Used   Substance and Sexual Activity    Alcohol use: Not Currently       Comment: rare    Drug use: Never    Sexual activity: Yes       Partners: Female            Family History:        Family History   Problem Relation Age of Onset    Hypertension Mother      No Known Problems Father      No Known Problems Sister      No Known Problems Brother      Hypertension Maternal Grandmother      Stroke Maternal Grandmother      Heart attack Paternal Grandmother           Review of Systems:  Review of Systems   Constitutional:  Negative for fatigue, fever and unexpected weight change.   Respiratory:  Negative for chest tightness and shortness of breath.    Cardiovascular:  Negative for chest pain.   Gastrointestinal:  Negative for abdominal pain, constipation, diarrhea, nausea and vomiting.   Genitourinary:  Negative for decreased urine volume, difficulty urinating, dysuria, enuresis, flank pain, frequency, genital sores, hematuria, penile discharge, penile pain, penile swelling, scrotal swelling, testicular pain and urgency.   Skin:  Negative for rash.   Psychiatric/Behavioral:  Negative for confusion and suicidal ideas.          Physical Exam:  Physical Exam  Constitutional:       General: He is not in acute distress.     Appearance: Normal appearance.      Comments: Current BMI of 45   HENT:      Head: Normocephalic and atraumatic.      Nose: Nose normal.      Mouth/Throat:      Mouth: Mucous membranes are moist.   Eyes:      Conjunctiva/sclera: Conjunctivae normal.   Cardiovascular:      Rate and Rhythm: Normal rate and regular rhythm.      Pulses: Normal pulses.      Heart sounds: Normal heart sounds.   Pulmonary:      Effort: Pulmonary effort is normal.      Breath sounds: Normal breath sounds.   Abdominal:      General: Bowel sounds are normal.      Palpations: Abdomen is soft.    Musculoskeletal:         General: Normal range of motion.      Cervical back: Normal range of motion.   Skin:     General: Skin is warm.   Neurological:      General: No focal deficit present.      Mental Status: He is alert and oriented to person, place, and time.   Psychiatric:         Mood and Affect: Mood normal.         Behavior: Behavior normal.         Thought Content: Thought content normal.         Judgment: Judgment normal.         Recent Image (CT and/or KUB):   CT Abdomen and Pelvis: No results found for this or any previous visit.     CT Stone Protocol: No results found for this or any previous visit.     KUB: No results found for this or any previous visit.        Labs:  Brief Urine Lab Results         None                     No visits with results within 3 Month(s) from this visit.   Latest known visit with results is:   Lab on 06/28/2023   Component Date Value Ref Range Status    TSH 06/28/2023 4.160  0.270 - 4.200 uIU/mL Final         Procedure: None  Procedures      I have reviewed and agree with the above PMH, PSH, FMH, social history, medications, allergies, and labs.      Assessment/Plan:   Problem List Items Addressed This Visit         Vasectomy evaluation - Primary     Relevant Orders     Case Request (Completed)         Health Maintenance:        Health Maintenance Due   Topic Date Due    TDAP/TD VACCINES (2 - Tdap) 05/25/2009    HEPATITIS C SCREENING  Never done    ANNUAL PHYSICAL  Never done    COVID-19 Vaccine (4 - 2023-24 season) 09/01/2023    INFLUENZA VACCINE  08/01/2024         Smoking Counseling: Never smoked or use smokeless tobacco.  Counseling given.     Urine Incontinence: Patient reports that he is not currently experiencing any symptoms of urinary incontinence.     Patient was given instructions and counseling regarding his condition or for health maintenance advice. Please see specific information pulled into the AVS if appropriate.     Patient Education:   Winnie Gutierrez  presents for consideration of an elective scrotal vasectomy.  I discussed the procedure at length including the risks of local anesthesia, bleeding, infection, testicular pain postoperatively, and a very low chance of long-term testicular pain.  He was informed of the rates of surgical complications such as a symptomatic hematoma and infection in the range of 1 to 2% and clinically from the risk of chronic significant scrotal pain in the range of 1 to 2%.  The patient was informed of a possible failure rate in the range of 1 in 10,000 and the important necessity to have a follow-up in about 8 weeks after the original procedure to be sure that the semen specimen is free of spermatozoa, thus ensuring sterility.  I discussed the various techniques out there including a 1 incision, 2 incision technique as well.  They understand that we will be giving local anesthesia and a mild antibiotic to take in the immediate perioperative period.  If he cannot tolerate the anesthesia for a variety of reasons including body habitus., we are glad to perform it under an anesthetic.  We discussed the technique of reversal when indicated including the approximate rate of 57% and the importance that this is strongly related to the time from the original vasectomy.  However, I stressed the fact that if they are even considering children in the future they should not use this as a form of temporary contraception. Patients can stop contraception once postvasectomy semen specimens show azoospermia or only rare nonmotile spermatozoa in the range of less than 100,000 sperm per mLnts.  Given patient's difficult anatomy and BMI 45 and recommend undergo procedure in the OR.  Will get him scheduled with Dr. Johnson on 9/20/2024.     Visit Diagnoses:  Visit Diagnosis       ICD-10-CM ICD-9-CM   1. Vasectomy evaluation  Z30.09 V25.09            Meds Ordered During Visit:  No orders of the defined types were placed in this encounter.        Follow Up  Appointment: Elective scrotal vasectomy in the OR  No follow-ups on file.  Alex

## 2024-09-20 NOTE — OP NOTE
VASECTOMY  Procedure Note    Grzegorz Gutierrez  9/20/2024    Pre-op Diagnosis:   Vasectomy evaluation [Z30.09]    Post-op Diagnosis:     Post-Op Diagnosis Codes:     * Vasectomy evaluation [Z30.09]    Procedure/CPT® Codes:  38-year-old white male for an elective scrotal vasectomy vasectomy status-patient presents for consideration of an elective scrotal vasectomy.  I discussed the procedure at length.  I discussed the fact that it has risks of local anesthesia, bleeding, infection, testicular pain postoperatively shortly, and a very low chance of long-term testicular pain.  Discussed the rates of surgical complications such as a symptomatic hematoma and infection in the range of 1 to 2% and clinically from the risk of chronic significant scrotal pain in the range of 1 to 2%.  Discussed the failure rate in the range of 1 in 10,000 and the important necessity to have a follow-up in about 8 weeks after the original procedure to be sure that the semen specimen is free of spermatozoa, thus ensuring sterility.  I discussed the various techniques out there including a 1 incision, 2 incision technique as well.  They understand that we will be giving local anesthesia with Valium the night before and the morning of and a mild antibiotic to take in the immediate perioperative period.  If he cannot tolerate the anesthesia for a variety of reasons including body habitus., we are glad to perform it under an anesthetic.  We discussed the technique of reversal when indicated including the approximate rate of 57% and the importance that this is strongly related to the time from the original vasectomy.  However, I stressed the fact that if they are even considering children in the future they should not use this as a form of temporary contraception patients can stop using contraception 1 postvasectomy semen specimens show azoospermia or only rare nonmotile spermatozoa in the range of less than 100,000 sperm per mL.  Elective scrotal  vasectomy -After an appropriate informed consent including the risks of anesthesia, infection, scrotal hematoma, failure in the range of 1 in 10,000, chronic testalgia, low testosterone, as well as the other very rare complications identified.  He was brought to the operative suite.  His scrotum was shaved and prepped in a sterile fashion using Betadine.  Local anesthetic was infiltrated into the midline scrotal raphae.  A midline scrotal incision was made and the right vas entrapped.  I anesthetized the spermatic cord and skin using 5 mL of 1% Xylocaine with epinephrine after an adequate period of analgesia.  I identified the vas and brought out into the incision.  The fascia was divided.  The vas was then doubly ligated, fulgurated, and sewn in the sheath away from the proximal end.  Bovie electrocautery had been used to fulgurate the end of both vasa as per the AUA guidelines.  Hemostasis was excellent and it was allowed to fall back in the scrotal sac.  The identical procedure was done on the contralateral side.  The skin was closed with a 3-0 chromic sutures.  Hemostasis was excellent.  He was recommended to use ice pack with a shield covering the scrotum to protect it from the ice.  He was given pain medication and antibiotics.  The incision was covered with bacitracin ointment.  The patient will be seen in 8 weeks whereupon we will then check a semen analysis to confirm the absence of spermatozoa and therby declare sterility.      Procedure(s):  VASECTOMY    Surgeon(s):  Kilo Johnson MD    Anesthesia: see anesthesia record    Staff:   Circulator: Angelia Crocker RN  Scrub Person: Sadaf Gutierrez  Assistant: Alondra Carroll LPN    Estimated Blood Loss: none  Urine Voided: * No values recorded between 9/20/2024  7:22 AM and 9/20/2024  7:46 AM *    Specimens:                ID Type Source Tests Collected by Time   A :  Tissue Vas Deferens, Left TISSUE EXAM, [facility]) Alex  Kilo CHARLES MD 9/20/2024 0730   B :  Tissue Vas Deferens, Right TISSUE EXAM, [facility] ([facility]) Kilo Johnson MD 9/20/2024 0730         Drains: None    Findings: Normal vasal anatomy    Blood: N/A    Complications: None    Grafts and Implants: None    Kilo Johnson MD     Date: 9/20/2024  Time: 07:51 EDT

## 2024-09-23 LAB — REF LAB TEST METHOD: NORMAL

## 2024-11-12 ENCOUNTER — TRANSCRIBE ORDERS (OUTPATIENT)
Dept: ADMINISTRATIVE | Facility: HOSPITAL | Age: 39
End: 2024-11-12
Payer: COMMERCIAL

## 2024-11-12 ENCOUNTER — LAB (OUTPATIENT)
Dept: LAB | Facility: HOSPITAL | Age: 39
End: 2024-11-12
Payer: COMMERCIAL

## 2024-11-12 DIAGNOSIS — E03.9 HYPOTHYROIDISM, UNSPECIFIED TYPE: Primary | ICD-10-CM

## 2024-11-12 DIAGNOSIS — E29.0 TESTICULAR HYPERFUNCTION: ICD-10-CM

## 2024-11-12 DIAGNOSIS — E66.01 MORBID OBESITY DUE TO EXCESS CALORIES: ICD-10-CM

## 2024-11-12 DIAGNOSIS — E03.9 HYPOTHYROIDISM, UNSPECIFIED TYPE: ICD-10-CM

## 2024-11-12 PROCEDURE — 82306 VITAMIN D 25 HYDROXY: CPT

## 2024-11-12 PROCEDURE — 84402 ASSAY OF FREE TESTOSTERONE: CPT

## 2024-11-12 PROCEDURE — 84439 ASSAY OF FREE THYROXINE: CPT

## 2024-11-12 PROCEDURE — 36415 COLL VENOUS BLD VENIPUNCTURE: CPT

## 2024-11-12 PROCEDURE — 84403 ASSAY OF TOTAL TESTOSTERONE: CPT

## 2024-11-12 PROCEDURE — 80050 GENERAL HEALTH PANEL: CPT

## 2024-11-12 PROCEDURE — 83735 ASSAY OF MAGNESIUM: CPT

## 2024-11-13 LAB
25(OH)D3 SERPL-MCNC: 35.7 NG/ML (ref 30–100)
ALBUMIN SERPL-MCNC: 4.2 G/DL (ref 3.5–5.2)
ALBUMIN/GLOB SERPL: 1.2 G/DL
ALP SERPL-CCNC: 90 U/L (ref 39–117)
ALT SERPL W P-5'-P-CCNC: 37 U/L (ref 1–41)
ANION GAP SERPL CALCULATED.3IONS-SCNC: 14.3 MMOL/L (ref 5–15)
AST SERPL-CCNC: 28 U/L (ref 1–40)
BASOPHILS # BLD AUTO: 0.02 10*3/MM3 (ref 0–0.2)
BASOPHILS NFR BLD AUTO: 0.2 % (ref 0–1.5)
BILIRUB SERPL-MCNC: 0.8 MG/DL (ref 0–1.2)
BUN SERPL-MCNC: 7 MG/DL (ref 6–20)
BUN/CREAT SERPL: 6.9 (ref 7–25)
CALCIUM SPEC-SCNC: 9.2 MG/DL (ref 8.6–10.5)
CHLORIDE SERPL-SCNC: 101 MMOL/L (ref 98–107)
CO2 SERPL-SCNC: 21.7 MMOL/L (ref 22–29)
CREAT SERPL-MCNC: 1.02 MG/DL (ref 0.76–1.27)
DEPRECATED RDW RBC AUTO: 39.2 FL (ref 37–54)
EGFRCR SERPLBLD CKD-EPI 2021: 95.3 ML/MIN/1.73
EOSINOPHIL # BLD AUTO: 0.28 10*3/MM3 (ref 0–0.4)
EOSINOPHIL NFR BLD AUTO: 3.3 % (ref 0.3–6.2)
ERYTHROCYTE [DISTWIDTH] IN BLOOD BY AUTOMATED COUNT: 13.3 % (ref 12.3–15.4)
GLOBULIN UR ELPH-MCNC: 3.4 GM/DL
GLUCOSE SERPL-MCNC: 68 MG/DL (ref 65–99)
HCT VFR BLD AUTO: 46.8 % (ref 37.5–51)
HGB BLD-MCNC: 15.5 G/DL (ref 13–17.7)
IMM GRANULOCYTES # BLD AUTO: 0.04 10*3/MM3 (ref 0–0.05)
IMM GRANULOCYTES NFR BLD AUTO: 0.5 % (ref 0–0.5)
LYMPHOCYTES # BLD AUTO: 2.28 10*3/MM3 (ref 0.7–3.1)
LYMPHOCYTES NFR BLD AUTO: 26.7 % (ref 19.6–45.3)
MAGNESIUM SERPL-MCNC: 2.3 MG/DL (ref 1.6–2.6)
MCH RBC QN AUTO: 27.1 PG (ref 26.6–33)
MCHC RBC AUTO-ENTMCNC: 33.1 G/DL (ref 31.5–35.7)
MCV RBC AUTO: 81.8 FL (ref 79–97)
MONOCYTES # BLD AUTO: 0.53 10*3/MM3 (ref 0.1–0.9)
MONOCYTES NFR BLD AUTO: 6.2 % (ref 5–12)
NEUTROPHILS NFR BLD AUTO: 5.38 10*3/MM3 (ref 1.7–7)
NEUTROPHILS NFR BLD AUTO: 63.1 % (ref 42.7–76)
NRBC BLD AUTO-RTO: 0 /100 WBC (ref 0–0.2)
PLATELET # BLD AUTO: 295 10*3/MM3 (ref 140–450)
PMV BLD AUTO: 10.7 FL (ref 6–12)
POTASSIUM SERPL-SCNC: 4 MMOL/L (ref 3.5–5.2)
PROT SERPL-MCNC: 7.6 G/DL (ref 6–8.5)
RBC # BLD AUTO: 5.72 10*6/MM3 (ref 4.14–5.8)
SODIUM SERPL-SCNC: 137 MMOL/L (ref 136–145)
T4 FREE SERPL-MCNC: 1.23 NG/DL (ref 0.92–1.68)
TSH SERPL DL<=0.05 MIU/L-ACNC: 3.62 UIU/ML (ref 0.27–4.2)
WBC NRBC COR # BLD AUTO: 8.53 10*3/MM3 (ref 3.4–10.8)

## 2024-11-16 LAB
TESTOST FREE SERPL-MCNC: 12.9 PG/ML (ref 6.8–21.5)
TESTOST SERPL-MCNC: 457 NG/DL (ref 264–916)

## 2024-11-19 ENCOUNTER — LAB (OUTPATIENT)
Dept: UROLOGY | Facility: CLINIC | Age: 39
End: 2024-11-19
Payer: COMMERCIAL

## 2024-11-19 DIAGNOSIS — Z98.52 VASECTOMY STATUS: ICD-10-CM

## 2024-11-19 DIAGNOSIS — Z30.09 VASECTOMY EVALUATION: Primary | ICD-10-CM

## 2024-11-19 LAB — SPERM - POST VASECTOMY: NORMAL

## 2024-11-19 PROCEDURE — 89321 SEMEN ANAL SPERM DETECTION: CPT | Performed by: UROLOGY

## 2024-11-26 ENCOUNTER — OFFICE VISIT (OUTPATIENT)
Dept: UROLOGY | Facility: CLINIC | Age: 39
End: 2024-11-26
Payer: COMMERCIAL

## 2024-11-26 VITALS
HEIGHT: 70 IN | SYSTOLIC BLOOD PRESSURE: 136 MMHG | HEART RATE: 80 BPM | WEIGHT: 315 LBS | BODY MASS INDEX: 45.1 KG/M2 | DIASTOLIC BLOOD PRESSURE: 79 MMHG

## 2024-11-26 DIAGNOSIS — Z98.52 STATUS POST VASECTOMY: Primary | ICD-10-CM

## 2024-11-26 NOTE — PROGRESS NOTES
"Chief Complaint:    Chief Complaint   Patient presents with    Sterilization     Post op - 8 week follow up        Vital Signs:   /79 (BP Location: Right arm, Patient Position: Sitting, Cuff Size: Large Adult)   Pulse 80   Ht 177.8 cm (70\")   Wt (!) 146 kg (322 lb 9.6 oz)   BMI 46.29 kg/m²   Body mass index is 46.29 kg/m².      HPI:  Grzegorz Gutierrez is a 39 y.o. male who presents today for follow up    History of Present Illness  Mr. Gutierrez presents to the clinic for postvasectomy follow-up.  Patient underwent an elective scrotal vasectomy in the OR setting by Dr. Johnson on 9/20/2024.  Pathology report showed normal left and right vas deferens.  He did have a semen analysis completed 1 week ago that showed no spermatozoa present.  Since surgery patient has been doing well and denies any testicular pain, scrotal swelling, hematuria, or other lower urinary tract symptoms.  He does endorse some mild hematospermia a few days after vasectomy but states this has completely resolved.  He has a well-healed surgical incision and we will see him back on an as-needed basis.  Pertinent negative symptoms include no abdominal pain, no anorexia, no joint pain, no change in stool, no chest pain, no chills, no congestion, no cough, no diaphoresis, no fatigue, no fever, no headaches, no joint swelling, no myalgias, no nausea, no neck pain, no numbness, no rash, no sore throat, no swollen glands, no dysuria, no vertigo, no visual change, no vomiting and no weakness.   Additional information:  This is a follow up appointment after surgery       Past Medical History:  Past Medical History:   Diagnosis Date    Disease of thyroid gland     GERD (gastroesophageal reflux disease)     Prediabetes     Sleep apnea        Current Meds:  Current Outpatient Medications   Medication Sig Dispense Refill    HYDROcodone-acetaminophen (NORCO)  MG per tablet Take 1 tablet by mouth Every 6 (Six) Hours As Needed for Moderate Pain. 12 " tablet 0    levothyroxine (SYNTHROID, LEVOTHROID) 25 MCG tablet Take 2 tablets by mouth Daily.      naproxen (NAPROSYN) 500 MG tablet Take 1 tablet by mouth 2 (Two) Times a Day With Meals. (Patient taking differently: Take 1 tablet by mouth 2 (Two) Times a Day As Needed for Mild Pain.) 60 tablet 2    Tirzepatide-Weight Management (ZEPBOUND) 2.5 MG/0.5ML solution Inject 0.5 mL under the skin into the appropriate area as directed 1 (One) Time Per Week.      vitamin D (ERGOCALCIFEROL) 1.25 MG (28808 UT) capsule capsule Take 1 capsule by mouth Every 7 (Seven) Days.       No current facility-administered medications for this visit.        Allergies:   No Known Allergies     Past Surgical History:  Past Surgical History:   Procedure Laterality Date    SKIN BIOPSY      VASECTOMY N/A 9/20/2024    Procedure: VASECTOMY;  Surgeon: Kilo Johnson MD;  Location: [facility];  Service: Urology;  Laterality: N/A;    WISDOM TOOTH EXTRACTION         Social History:  Social History     Socioeconomic History    Marital status:    Tobacco Use    Smoking status: Never     Passive exposure: Never    Smokeless tobacco: Never   Vaping Use    Vaping status: Never Used   Substance and Sexual Activity    Alcohol use: Not Currently     Comment: rare    Drug use: Never    Sexual activity: Yes     Partners: Female       Family History:  Family History   Problem Relation Age of Onset    Hypertension Mother     No Known Problems Father     No Known Problems Sister     No Known Problems Brother     Hypertension Maternal Grandmother     Stroke Maternal Grandmother     Heart attack Paternal Grandmother        Review of Systems:  Review of Systems   Constitutional:  Negative for chills, diaphoresis, fatigue, fever and unexpected weight change.   HENT:  Negative for congestion and sore throat.    Respiratory:  Negative for cough, chest tightness and shortness of breath.    Cardiovascular:  Negative for chest pain.   Gastrointestinal:   Negative for abdominal pain, anorexia, constipation, diarrhea, nausea and vomiting.   Genitourinary:  Negative for difficulty urinating, dysuria, frequency and urgency.   Musculoskeletal:  Negative for joint pain, myalgias and neck pain.   Skin:  Negative for rash.   Neurological:  Negative for vertigo, weakness, numbness and headaches.   Hematological:  Does not bruise/bleed easily.   Psychiatric/Behavioral:  Negative for confusion and suicidal ideas.        Physical Exam:  Physical Exam  Constitutional:       General: He is not in acute distress.     Appearance: Normal appearance.   HENT:      Head: Normocephalic and atraumatic.      Nose: Nose normal.      Mouth/Throat:      Mouth: Mucous membranes are moist.   Eyes:      Conjunctiva/sclera: Conjunctivae normal.   Cardiovascular:      Rate and Rhythm: Normal rate and regular rhythm.      Pulses: Normal pulses.      Heart sounds: Normal heart sounds.   Pulmonary:      Effort: Pulmonary effort is normal.      Breath sounds: Normal breath sounds.   Abdominal:      General: Bowel sounds are normal.      Palpations: Abdomen is soft.   Genitourinary:     Penis: Normal.       Testes: Normal.      Comments: Well-healed surgical incision  Musculoskeletal:         General: Normal range of motion.      Cervical back: Normal range of motion.   Skin:     General: Skin is warm.   Neurological:      General: No focal deficit present.      Mental Status: He is alert and oriented to person, place, and time.   Psychiatric:         Mood and Affect: Mood normal.         Behavior: Behavior normal.         Thought Content: Thought content normal.         Judgment: Judgment normal.       Recent Image (CT and/or KUB):   CT Abdomen and Pelvis: No results found for this or any previous visit.     CT Stone Protocol: No results found for this or any previous visit.     KUB: No results found for this or any previous visit.       Labs:  Brief Urine Lab Results       None          Lab on  11/19/2024   Component Date Value Ref Range Status    Sperm - Post Vasectomy 11/19/2024 No sperm seen  No sperm seen Final   Lab on 11/12/2024   Component Date Value Ref Range Status    Glucose 11/12/2024 68  65 - 99 mg/dL Final    BUN 11/12/2024 7  6 - 20 mg/dL Final    Creatinine 11/12/2024 1.02  0.76 - 1.27 mg/dL Final    Sodium 11/12/2024 137  136 - 145 mmol/L Final    Potassium 11/12/2024 4.0  3.5 - 5.2 mmol/L Final    Slight hemolysis detected by analyzer. Result may be falsely elevated.    Chloride 11/12/2024 101  98 - 107 mmol/L Final    CO2 11/12/2024 21.7 (L)  22.0 - 29.0 mmol/L Final    Calcium 11/12/2024 9.2  8.6 - 10.5 mg/dL Final    Total Protein 11/12/2024 7.6  6.0 - 8.5 g/dL Final    Albumin 11/12/2024 4.2  3.5 - 5.2 g/dL Final    ALT (SGPT) 11/12/2024 37  1 - 41 U/L Final    AST (SGOT) 11/12/2024 28  1 - 40 U/L Final    Alkaline Phosphatase 11/12/2024 90  39 - 117 U/L Final    Total Bilirubin 11/12/2024 0.8  0.0 - 1.2 mg/dL Final    Globulin 11/12/2024 3.4  gm/dL Final    A/G Ratio 11/12/2024 1.2  g/dL Final    BUN/Creatinine Ratio 11/12/2024 6.9 (L)  7.0 - 25.0 Final    Anion Gap 11/12/2024 14.3  5.0 - 15.0 mmol/L Final    eGFR 11/12/2024 95.3  >60.0 mL/min/1.73 Final    25 Hydroxy, Vitamin D 11/12/2024 35.7  30.0 - 100.0 ng/ml Final    Magnesium 11/12/2024 2.3  1.6 - 2.6 mg/dL Final    Testosterone, Total 11/12/2024 457  264 - 916 ng/dL Final    Adult male reference interval is based on a population of  healthy nonobese males (BMI <30) between 19 and 39 years old.  willie Jha.al. JCEM 2017,102;3589-4199. PMID: [id number].    Testosterone, Free 11/12/2024 12.9  6.8 - 21.5 pg/mL Final    TSH 11/12/2024 3.620  0.270 - 4.200 uIU/mL Final    Free T4 11/12/2024 1.23  0.92 - 1.68 ng/dL Final    WBC 11/12/2024 8.53  3.40 - 10.80 10*3/mm3 Final    RBC 11/12/2024 5.72  4.14 - 5.80 10*6/mm3 Final    Hemoglobin 11/12/2024 15.5  13.0 - 17.7 g/dL Final    Hematocrit 11/12/2024 46.8  37.5 - 51.0 % Final    MCV  2024 81.8  79.0 - 97.0 fL Final    MCH 2024 27.1  26.6 - 33.0 pg Final    MCHC 2024 33.1  31.5 - 35.7 g/dL Final    RDW 2024 13.3  12.3 - 15.4 % Final    RDW-SD 2024 39.2  37.0 - 54.0 fl Final    MPV 2024 10.7  6.0 - 12.0 fL Final    Platelets 2024 295  140 - 450 10*3/mm3 Final    Neutrophil % 2024 63.1  42.7 - 76.0 % Final    Lymphocyte % 2024 26.7  19.6 - 45.3 % Final    Monocyte % 2024 6.2  5.0 - 12.0 % Final    Eosinophil % 2024 3.3  0.3 - 6.2 % Final    Basophil % 2024 0.2  0.0 - 1.5 % Final    Immature Grans % 2024 0.5  0.0 - 0.5 % Final    Neutrophils, Absolute 2024 5.38  1.70 - 7.00 10*3/mm3 Final    Lymphocytes, Absolute 2024 2.28  0.70 - 3.10 10*3/mm3 Final    Monocytes, Absolute 2024 0.53  0.10 - 0.90 10*3/mm3 Final    Eosinophils, Absolute 2024 0.28  0.00 - 0.40 10*3/mm3 Final    Basophils, Absolute 2024 0.02  0.00 - 0.20 10*3/mm3 Final    Immature Grans, Absolute 2024 0.04  0.00 - 0.05 10*3/mm3 Final    nRBC 2024 0.0  0.0 - 0.2 /100 WBC Final   Admission on 2024, Discharged on 2024   Component Date Value Ref Range Status    Reference Lab Report 2024    Final                    Value:[facility]  [address] [place] [address]  Phone: [phone] or [phone]  Fax: [phone]  Colin Dasilva M.D., Medical Director    PATIENT NAME                           LABORATORY NO.  [id number]  IRINA LAWSON                    [id number]  [id number]                         AGE              SEX  SSN           CLIENT REF #  [facility] [place]              38      1985      [id number]   [id number]    [address]                     REQUESTING M.D.     [place] [address]                   JOAO MAURO  DATE COLLECTED      DATE RECEIVED      DATE REPORTED  2024        "  09/23/2024    DIAGNOSIS:  A.   VAS DEFERENS, STERILIZATION, LEFT:  Complete cross-section of vas deferens with no significant diagnostic  alterations  B.   VAS DEFERENS, STERILIZATION, RIGHT:  Complete cross-section of vas deferens with no significant diagnostic  alterations    CAM    CLINICAL                           HISTORY:  Vasectomy evaluation    SPECIMENS RECEIVED:  A.  VAS DEFERENS, STERILIZATION, LEFT  B.  VAS DEFERENS, STERILIZATION, RIGHT    MICROSCOPIC DESCRIPTION:  Tissue blocks are prepared and slides are examined microscopically on all  specimens. See diagnosis for details.    Professional interpretation rendered by Alexia Carlisle M.D., [place] at  [facility], [address] [place] [address].    GROSS DESCRIPTION:  A.  Labeled \"vas deferens, left\" is a single portion of tan cylindrical soft tissue  measuring 0.9 cm in length and 0.3 cm in diameter, submitted entirely 1  block as received to be sectioned at embedding.  [facility]  B.  Labeled \"vas deferens, right\" is a single portion of tan cylindrical soft  tissue measuring 1.3 cm in length and 0.2 cm in diameter, submitted  entirely 1 block as received to be sectioned at embedding.    REVIEWED, DIAGNOSED AND ELECTRONICALLY  SIGNED BY:    Alexia Carlisle M.D., YANETHAWinnieP.  CPT CODES:  88302x2     Pre-Admission Testing on 09/18/2024   Component Date Value Ref Range Status    Glucose 09/18/2024 108 (H)  65 - 99 mg/dL Final    BUN 09/18/2024 13  6 - 20 mg/dL Final    Creatinine 09/18/2024 0.81  0.76 - 1.27 mg/dL Final    Sodium 09/18/2024 138  136 - 145 mmol/L Final    Potassium 09/18/2024 4.1  3.5 - 5.2 mmol/L Final    Chloride 09/18/2024 102  98 - 107 mmol/L Final    CO2 09/18/2024 27.4  22.0 - 29.0 mmol/L Final    Calcium 09/18/2024 9.6  8.6 - 10.5 mg/dL Final    BUN/Creatinine Ratio 09/18/2024 16.0  7.0 - 25.0 Final    Anion Gap 09/18/2024 8.6  5.0 - 15.0 mmol/L Final    eGFR 09/18/2024 114.3  >60.0 mL/min/1.73 Final    WBC 09/18/2024 6.99  " 3.40 - 10.80 10*3/mm3 Final    RBC 09/18/2024 5.76  4.14 - 5.80 10*6/mm3 Final    Hemoglobin 09/18/2024 15.7  13.0 - 17.7 g/dL Final    Hematocrit 09/18/2024 47.2  37.5 - 51.0 % Final    MCV 09/18/2024 81.9  79.0 - 97.0 fL Final    MCH 09/18/2024 27.3  26.6 - 33.0 pg Final    MCHC 09/18/2024 33.3  31.5 - 35.7 g/dL Final    RDW 09/18/2024 13.9  12.3 - 15.4 % Final    RDW-SD 09/18/2024 40.7  37.0 - 54.0 fl Final    MPV 09/18/2024 10.1  6.0 - 12.0 fL Final    Platelets 09/18/2024 248  140 - 450 10*3/mm3 Final        Procedure: None  Procedures     I have reviewed and agree with the above PMH, PSH, FMH, social history, medications, allergies, and labs.     Assessment/Plan:   Problem List Items Addressed This Visit       Status post vasectomy - Primary       Health Maintenance:   Health Maintenance Due   Topic Date Due    TDAP/TD VACCINES (2 - Tdap) 05/25/2009    HEPATITIS C SCREENING  Never done    ANNUAL PHYSICAL  Never done    INFLUENZA VACCINE  07/01/2024    COVID-19 Vaccine (4 - 2024-25 season) 09/01/2024        Smoking Counseling: Never smoked.  Never used smokeless tobacco.    Urine Incontinence: Patient reports that he is not currently experiencing any symptoms of urinary incontinence.    Patient was given instructions and counseling regarding his condition or for health maintenance advice. Please see specific information pulled into the AVS if appropriate.    Patient Education:   Status post vasectomy -patient is 8 weeks status post successful vasectomy with normal pathology and a negative semen analysis.  I gave the patient reassurance and advised him he is cleared from urological standpoint to no longer use contraceptive methods.  Did educate patient to return to the clinic if he has any concerns or issues.  Otherwise we will see him back on an as-needed basis.  He verbalized understanding.    Visit Diagnoses:    ICD-10-CM ICD-9-CM   1. Status post vasectomy  Z98.52 V26.52       Meds Ordered During Visit:  No  orders of the defined types were placed in this encounter.      Follow Up Appointment: As needed  No follow-ups on file.      This document has been electronically signed by Devang Cramer PA-C   November 26, 2024 09:28 EST    Part of this note may be an electronic transcription/translation of spoken language to printed text using the Dragon Dictation System.

## 2025-01-17 ENCOUNTER — TRANSCRIBE ORDERS (OUTPATIENT)
Dept: ADMINISTRATIVE | Facility: HOSPITAL | Age: 40
End: 2025-01-17
Payer: COMMERCIAL

## 2025-01-17 ENCOUNTER — LAB (OUTPATIENT)
Dept: LAB | Facility: HOSPITAL | Age: 40
End: 2025-01-17
Payer: COMMERCIAL

## 2025-01-17 DIAGNOSIS — E03.9 MYXEDEMA HEART DISEASE: Primary | ICD-10-CM

## 2025-01-17 DIAGNOSIS — E55.9 AVITAMINOSIS D: ICD-10-CM

## 2025-01-17 DIAGNOSIS — I51.9 MYXEDEMA HEART DISEASE: Primary | ICD-10-CM

## 2025-01-17 DIAGNOSIS — E66.01 MORBID OBESITY: ICD-10-CM

## 2025-01-17 DIAGNOSIS — E03.9 MYXEDEMA HEART DISEASE: ICD-10-CM

## 2025-01-17 DIAGNOSIS — E29.0 TESTICULAR HYPERFUNCTION: ICD-10-CM

## 2025-01-17 DIAGNOSIS — I51.9 MYXEDEMA HEART DISEASE: ICD-10-CM

## 2025-01-17 LAB
25(OH)D3 SERPL-MCNC: 35.2 NG/ML (ref 30–100)
ALBUMIN SERPL-MCNC: 4.2 G/DL (ref 3.5–5.2)
ALBUMIN/GLOB SERPL: 1.3 G/DL
ALP SERPL-CCNC: 85 U/L (ref 39–117)
ALT SERPL W P-5'-P-CCNC: 37 U/L (ref 1–41)
ANION GAP SERPL CALCULATED.3IONS-SCNC: 9.7 MMOL/L (ref 5–15)
AST SERPL-CCNC: 26 U/L (ref 1–40)
BASOPHILS # BLD AUTO: 0.04 10*3/MM3 (ref 0–0.2)
BASOPHILS NFR BLD AUTO: 0.5 % (ref 0–1.5)
BILIRUB SERPL-MCNC: 0.7 MG/DL (ref 0–1.2)
BUN SERPL-MCNC: 14 MG/DL (ref 6–20)
BUN/CREAT SERPL: 15.1 (ref 7–25)
CALCIUM SPEC-SCNC: 9.4 MG/DL (ref 8.6–10.5)
CHLORIDE SERPL-SCNC: 101 MMOL/L (ref 98–107)
CO2 SERPL-SCNC: 25.3 MMOL/L (ref 22–29)
CREAT SERPL-MCNC: 0.93 MG/DL (ref 0.76–1.27)
DEPRECATED RDW RBC AUTO: 38.2 FL (ref 37–54)
EGFRCR SERPLBLD CKD-EPI 2021: 105.8 ML/MIN/1.73
EOSINOPHIL # BLD AUTO: 0.2 10*3/MM3 (ref 0–0.4)
EOSINOPHIL NFR BLD AUTO: 2.5 % (ref 0.3–6.2)
ERYTHROCYTE [DISTWIDTH] IN BLOOD BY AUTOMATED COUNT: 13.1 % (ref 12.3–15.4)
GLOBULIN UR ELPH-MCNC: 3.3 GM/DL
GLUCOSE SERPL-MCNC: 90 MG/DL (ref 65–99)
HCT VFR BLD AUTO: 50.3 % (ref 37.5–51)
HGB BLD-MCNC: 16.1 G/DL (ref 13–17.7)
IMM GRANULOCYTES # BLD AUTO: 0.04 10*3/MM3 (ref 0–0.05)
IMM GRANULOCYTES NFR BLD AUTO: 0.5 % (ref 0–0.5)
LYMPHOCYTES # BLD AUTO: 2.46 10*3/MM3 (ref 0.7–3.1)
LYMPHOCYTES NFR BLD AUTO: 30.4 % (ref 19.6–45.3)
MCH RBC QN AUTO: 26.1 PG (ref 26.6–33)
MCHC RBC AUTO-ENTMCNC: 32 G/DL (ref 31.5–35.7)
MCV RBC AUTO: 81.5 FL (ref 79–97)
MONOCYTES # BLD AUTO: 0.49 10*3/MM3 (ref 0.1–0.9)
MONOCYTES NFR BLD AUTO: 6.1 % (ref 5–12)
NEUTROPHILS NFR BLD AUTO: 4.86 10*3/MM3 (ref 1.7–7)
NEUTROPHILS NFR BLD AUTO: 60 % (ref 42.7–76)
NRBC BLD AUTO-RTO: 0 /100 WBC (ref 0–0.2)
PLATELET # BLD AUTO: 277 10*3/MM3 (ref 140–450)
PMV BLD AUTO: 10.5 FL (ref 6–12)
POTASSIUM SERPL-SCNC: 4.9 MMOL/L (ref 3.5–5.2)
PROT SERPL-MCNC: 7.5 G/DL (ref 6–8.5)
RBC # BLD AUTO: 6.17 10*6/MM3 (ref 4.14–5.8)
SODIUM SERPL-SCNC: 136 MMOL/L (ref 136–145)
T-UPTAKE NFR SERPL: 1.16 TBI (ref 0.8–1.3)
T4 FREE SERPL-MCNC: 1.11 NG/DL (ref 0.92–1.68)
T4 SERPL-MCNC: 8.52 MCG/DL (ref 4.5–11.7)
TSH SERPL DL<=0.05 MIU/L-ACNC: 1.51 UIU/ML (ref 0.27–4.2)
WBC NRBC COR # BLD AUTO: 8.09 10*3/MM3 (ref 3.4–10.8)

## 2025-01-17 PROCEDURE — 84439 ASSAY OF FREE THYROXINE: CPT

## 2025-01-17 PROCEDURE — 84402 ASSAY OF FREE TESTOSTERONE: CPT

## 2025-01-17 PROCEDURE — 36415 COLL VENOUS BLD VENIPUNCTURE: CPT

## 2025-01-17 PROCEDURE — 80050 GENERAL HEALTH PANEL: CPT

## 2025-01-17 PROCEDURE — 84479 ASSAY OF THYROID (T3 OR T4): CPT

## 2025-01-17 PROCEDURE — 82306 VITAMIN D 25 HYDROXY: CPT

## 2025-01-17 PROCEDURE — 84403 ASSAY OF TOTAL TESTOSTERONE: CPT

## 2025-01-22 LAB
TESTOST FREE SERPL-MCNC: 12.5 PG/ML (ref 6.8–21.5)
TESTOST SERPL-MCNC: 400 NG/DL (ref 264–916)

## 2025-02-15 ENCOUNTER — APPOINTMENT (OUTPATIENT)
Dept: GENERAL RADIOLOGY | Facility: HOSPITAL | Age: 40
End: 2025-02-15
Payer: COMMERCIAL

## 2025-02-15 ENCOUNTER — HOSPITAL ENCOUNTER (EMERGENCY)
Facility: HOSPITAL | Age: 40
Discharge: HOME | End: 2025-02-15
Attending: EMERGENCY MEDICINE
Payer: COMMERCIAL

## 2025-02-15 VITALS
SYSTOLIC BLOOD PRESSURE: 129 MMHG | WEIGHT: 307 LBS | HEIGHT: 70 IN | DIASTOLIC BLOOD PRESSURE: 70 MMHG | BODY MASS INDEX: 43.95 KG/M2 | OXYGEN SATURATION: 99 % | HEART RATE: 89 BPM | TEMPERATURE: 98.5 F | RESPIRATION RATE: 18 BRPM

## 2025-02-15 DIAGNOSIS — S93.401A SPRAIN OF RIGHT ANKLE, UNSPECIFIED LIGAMENT, INITIAL ENCOUNTER: Primary | ICD-10-CM

## 2025-02-15 PROCEDURE — 73630 X-RAY EXAM OF FOOT: CPT

## 2025-02-15 PROCEDURE — 73610 X-RAY EXAM OF ANKLE: CPT

## 2025-02-15 PROCEDURE — 73610 X-RAY EXAM OF ANKLE: CPT | Performed by: RADIOLOGY

## 2025-02-15 PROCEDURE — 99283 EMERGENCY DEPT VISIT LOW MDM: CPT

## 2025-02-15 PROCEDURE — 73630 X-RAY EXAM OF FOOT: CPT | Performed by: RADIOLOGY

## 2025-02-15 RX ORDER — IBUPROFEN 800 MG/1
800 TABLET, FILM COATED ORAL 3 TIMES DAILY PRN
Qty: 60 TABLET | Refills: 0 | Status: SHIPPED | OUTPATIENT
Start: 2025-02-15

## 2025-02-15 NOTE — ED PROVIDER NOTES
Subjective   History of Present Illness  Patient is a 39-year-old male presents today with right ankle pain.  Patient reports that he slipped off of his porch and states that his right ankle turned.  Patient has pain on the lateral and medial portion of the ankle.  Patient reports pain is worse ambulation and weightbearing.  Patient denies trying interventions.    Ankle Injury      Review of Systems   Constitutional: Negative.    HENT: Negative.     Eyes: Negative.    Respiratory: Negative.     Cardiovascular: Negative.    Gastrointestinal: Negative.    Endocrine: Negative.    Genitourinary: Negative.    Musculoskeletal: Negative.    Skin: Negative.    Allergic/Immunologic: Negative.    Neurological: Negative.    Hematological: Negative.    Psychiatric/Behavioral: Negative.         Past Medical History:   Diagnosis Date    Disease of thyroid gland     GERD (gastroesophageal reflux disease)     Prediabetes     Sleep apnea        No Known Allergies    Past Surgical History:   Procedure Laterality Date    SKIN BIOPSY      VASECTOMY N/A 9/20/2024    Procedure: VASECTOMY;  Surgeon: Kilo Johnson MD;  Location: [facility];  Service: Urology;  Laterality: N/A;    WISDOM TOOTH EXTRACTION         Family History   Problem Relation Age of Onset    Hypertension Mother     No Known Problems Father     No Known Problems Sister     No Known Problems Brother     Hypertension Maternal Grandmother     Stroke Maternal Grandmother     Heart attack Paternal Grandmother        Social History     Socioeconomic History    Marital status:    Tobacco Use    Smoking status: Never     Passive exposure: Never    Smokeless tobacco: Never   Vaping Use    Vaping status: Never Used   Substance and Sexual Activity    Alcohol use: Not Currently     Comment: rare    Drug use: Never    Sexual activity: Yes     Partners: Female           Objective   Physical Exam  Vitals and nursing note reviewed.   Constitutional:       Appearance: He  is well-developed.   HENT:      Head: Normocephalic.      Right Ear: External ear normal.      Left Ear: External ear normal.   Eyes:      Conjunctiva/sclera: Conjunctivae normal.      Pupils: Pupils are equal, round, and reactive to light.   Cardiovascular:      Rate and Rhythm: Normal rate and regular rhythm.      Heart sounds: Normal heart sounds.   Pulmonary:      Effort: Pulmonary effort is normal.      Breath sounds: Normal breath sounds.   Abdominal:      General: Bowel sounds are normal.      Palpations: Abdomen is soft.   Musculoskeletal:         General: Normal range of motion.      Cervical back: Normal range of motion and neck supple.      Right ankle: Tenderness present. Decreased range of motion.   Skin:     General: Skin is warm and dry.      Capillary Refill: Capillary refill takes less than 2 seconds.   Neurological:      Mental Status: He is alert and oriented to person, place, and time.   Psychiatric:         Behavior: Behavior normal.         Thought Content: Thought content normal.         Procedures         XR Foot 3+ View Right   Final Result   No fracture.   Generalized soft tissue swelling.                    [id number], Zip code [address].           This report was finalized on 2/15/2025 10:59 AM by Dr. Maria Elena John MD.          XR Ankle 3+ View Right   Final Result   No fracture.   Generalized soft tissue swelling.                    [id number], Zip code [address].           This report was finalized on 2/15/2025 10:59 AM by Dr. Maria Elena John MD.              ED Course                                           XR Foot 3+ View Right   Final Result   No fracture.   Generalized soft tissue swelling.                    [id number], Zip code [address].           This report was finalized on 2/15/2025 10:59 AM by Dr. Maria Elena John MD.          XR Ankle 3+ View Right   Final Result   No fracture.   Generalized soft tissue swelling.                    [id number], Zip code [address].           This report was  finalized on 2/15/2025 10:59 AM by Dr. Maria Elena John MD.                        Medical Decision Making  MDM:    Escalation of care including admission/observation considered    - Discussions of management with other providers:  None    - Discussed/reviewed with Radiology regarding test interpretation    - Independent interpretation: None    - Additional patient history obtained from: None    - Review of external non-ED record (if available):  Prior Inpt record, Office record, Outpt record, Prior Outpt labs, PCP record, Outside ED record, Other    - Chronic conditions affecting care: See HPI and medical Hx.    - Social Determinants of health significantly affecting care:  None        Medical Decision Making Discussion:    Patient is a 39-year-old male presents today with right ankle pain.  Patient reports that he slipped off of his porch and states that his right ankle turned.  Patient has pain on the lateral and medial portion of the ankle.  Patient reports pain is worse ambulation and weightbearing.  Patient denies trying interventions.      The patient has been given very strict return precautions to return to the emergency department should there be any acute change or worsening of their condition.  I have explained my findings and the patient has expressed understanding to me.  I explained that the work-up performed in the ED has been based on the specific complaint and concern, as the nature of emergency medicine is complaint driven and they understand that new symptoms may arise.  I have told them that, should there be any new symptoms, worsening or changing symptoms, a new work-up may be indicated that they are encouraged to return to the emergency department or promptly contact their primary care physician. We have employed a shared decision-making process as the discussion of their disposition.  The patient has been educated as to the nature of the visit, the tests and work-up performed and the findings  from today's visit. At this time, there does not appear to be any acute emergent process that necessitates admission to the hospital, however, the patient understands that this can change unexpectedly. At this time, the patient is stable for discharge home and agrees to follow-up with her primary care physician in the next 24 to 48 hours or earlier should they be able to obtain an appointment.    The patient was counseled regarding diagnostic results and treatment plan and patient has indicated understanding of these instructions.      Problems Addressed:  Sprain of right ankle, unspecified ligament, initial encounter: complicated acute illness or injury    Amount and/or Complexity of Data Reviewed  Radiology: ordered. Decision-making details documented in ED Course.    Risk  Prescription drug management.        Final diagnoses:   Sprain of right ankle, unspecified ligament, initial encounter       ED Disposition  ED Disposition       ED Disposition   Discharge    Condition   Stable    Comment   --               Mary Kearney, APRN  [address] [facility] [address] [phone]    Schedule an appointment as soon as possible for a visit   For further evaluation         Medication List        New Prescriptions      ibuprofen 800 MG tablet  Commonly known as: ADVIL,MOTRIN  Take 1 tablet by mouth 3 (Three) Times a Day As Needed (pain).               Where to Get Your Medications        These medications were sent to [facility] - [place], [place] - [address] [place] [address] - [phone] [facility] [phone]   [address] [place] [address]      Phone: [phone]   ibuprofen 800 MG tablet            Galindo Veliz, APRN  02/15/25 [id number]

## 2025-02-15 NOTE — DISCHARGE INSTRUCTIONS

## 2025-02-17 ENCOUNTER — OFFICE VISIT (OUTPATIENT)
Dept: ORTHOPEDIC SURGERY | Facility: CLINIC | Age: 40
End: 2025-02-17
Payer: COMMERCIAL

## 2025-02-17 VITALS — HEIGHT: 70 IN | BODY MASS INDEX: 43.95 KG/M2 | WEIGHT: 307 LBS

## 2025-02-17 DIAGNOSIS — S93.401A MODERATE RIGHT ANKLE SPRAIN, INITIAL ENCOUNTER: Primary | ICD-10-CM

## 2025-02-17 PROCEDURE — 99213 OFFICE O/P EST LOW 20 MIN: CPT | Performed by: PHYSICIAN ASSISTANT

## 2025-02-17 RX ORDER — TESTOSTERONE CYPIONATE 200 MG/ML
INJECTION, SOLUTION INTRAMUSCULAR
COMMUNITY
Start: 2025-02-11

## 2025-02-17 NOTE — PROGRESS NOTES
[facility] Orthopaedic Surgery Established Patient Visit        Patient: Grzegorz Gutierrez  YOB: 1985  Date of Encounter: 2/17/2025  PCP: Mary Kearney APRN      Subjective     Chief Complaint   Patient presents with    Right Ankle - Pain, Edema     New-Problem             History of Present Illness:     Grzegorz Gutierrez is a 39 y.o. male presents today for follow-up new problem right ankle injury.  Patient reports that he was lifting and carrying furniture when he rolled his ankle off the edge of the blacktop.  The patient reported ankle inversion with difficulty upon attempted ambulation and range of motion.  The patient presented for radiographs and has been altering his immobilization as result.  The patient was placed in equalizer boot which she has been of some benefit.  He reports dull throbbing aching sensation to the lateral aspect of the ankle.             Patient Active Problem List   Diagnosis    Palpitation    Snoring    Morbid obesity    REGINO (obstructive sleep apnea)    Status post vasectomy     Past Medical History:   Diagnosis Date    Disease of thyroid gland     GERD (gastroesophageal reflux disease)     Prediabetes     Sleep apnea      Past Surgical History:   Procedure Laterality Date    SKIN BIOPSY      VASECTOMY N/A 9/20/2024    Procedure: VASECTOMY;  Surgeon: Kilo Johnson MD;  Location: [facility];  Service: Urology;  Laterality: N/A;    WISDOM TOOTH EXTRACTION       Social History     Occupational History    Not on file   Tobacco Use    Smoking status: Never     Passive exposure: Never    Smokeless tobacco: Never   Vaping Use    Vaping status: Never Used   Substance and Sexual Activity    Alcohol use: Not Currently     Comment: rare    Drug use: Never    Sexual activity: Yes     Partners: Female    Grzegorz Gutierrez  reports that he has never smoked. He has never been exposed to tobacco smoke. He has never used smokeless tobacco.. I have educated him on the risk of  "diseases from using tobacco products such as cancer, COPD, and heart disease.        Social History     Social History Narrative    Caffeine:0-1  serving per day. None this week.     Family History   Problem Relation Age of Onset    Hypertension Mother     No Known Problems Father     No Known Problems Sister     No Known Problems Brother     Hypertension Maternal Grandmother     Stroke Maternal Grandmother     Heart attack Paternal Grandmother      Current Outpatient Medications   Medication Sig Dispense Refill    ibuprofen (ADVIL,MOTRIN) 800 MG tablet Take 1 tablet by mouth 3 (Three) Times a Day As Needed (pain). 60 tablet 0    levothyroxine (SYNTHROID, LEVOTHROID) 25 MCG tablet Take 2 tablets by mouth Daily.      Testosterone Cypionate (DEPOTESTOTERONE CYPIONATE) 200 MG/ML injection       Tirzepatide-Weight Management (ZEPBOUND) 2.5 MG/0.5ML solution Inject 0.5 mL under the skin into the appropriate area as directed 1 (One) Time Per Week.      vitamin D (ERGOCALCIFEROL) 1.25 MG (15892 UT) capsule capsule Take 1 capsule by mouth Every 7 (Seven) Days.       No current facility-administered medications for this visit.     No Known Allergies         Review of Systems   Constitutional: Negative.   HENT: Negative.     Eyes: Negative.    Cardiovascular: Negative.    Respiratory: Negative.          Sleep apnea   Endocrine: Negative.    Hematologic/Lymphatic: Negative.    Skin: Negative.    Musculoskeletal:         Pertinent positives listed in HPI   Gastrointestinal: Negative.    Genitourinary: Negative.    Neurological: Negative.    Psychiatric/Behavioral: Negative.     Allergic/Immunologic: Negative.          Objective      Vitals:    02/17/25 1403   Weight: (!) 139 kg (307 lb)   Height: 177.8 cm (70\")        Class 3 Severe Obesity (BMI >=40). Obesity-related health conditions include the following:  listed in PMH . Obesity is newly identified. BMI is is above average; BMI management plan is completed. We discussed " portion control and increasing exercise.      Physical Exam  Vitals and nursing note reviewed.   Constitutional:       General: He is not in acute distress.     Appearance: Normal appearance. He is not ill-appearing.   HENT:      Head: Normocephalic and atraumatic.      Right Ear: External ear normal.      Left Ear: External ear normal.      Nose: Nose normal.      Mouth/Throat:      Mouth: Mucous membranes are moist.      Pharynx: Oropharynx is clear.   Eyes:      Extraocular Movements: Extraocular movements intact.      Conjunctiva/sclera: Conjunctivae normal.      Pupils: Pupils are equal, round, and reactive to light.   Cardiovascular:      Rate and Rhythm: Normal rate.      Pulses: Normal pulses.   Pulmonary:      Effort: Pulmonary effort is normal.   Abdominal:      General: There is no distension.   Musculoskeletal:      Cervical back: Normal range of motion. No rigidity.      Comments: Examination today of patient's right ankle reveals there is tenderness to palpation along the lateral ligament complex most notably the ATFL.  The patient has noted full flexion and extension based on pain and symptoms.  Patient has difficulty upon ankle inversion.  Subtalar joint motion intact.  Neurovascular status grossly intact       Skin:     General: Skin is warm and dry.      Capillary Refill: Capillary refill takes less than 2 seconds.   Neurological:      General: No focal deficit present.      Mental Status: He is alert and oriented to person, place, and time.   Psychiatric:         Mood and Affect: Mood normal.         Behavior: Behavior normal.             Radiology:      XR Ankle 3+ View Right    Result Date: 2/15/2025  No fracture. Generalized soft tissue swelling.     [id number], Zip code [id number].   This report was finalized on 2/15/2025 10:59 AM by Dr. Maria Elena John MD.      XR Foot 3+ View Right    Result Date: 2/15/2025  No fracture. Generalized soft tissue swelling.     [id number], Zip code [address].   This report  was finalized on 2/15/2025 10:59 AM by Dr. Maria Elena John MD.             Assessment/Plan        ICD-10-CM ICD-9-CM   1. Moderate right ankle sprain, initial encounter  S93.401A 845.00       39-year-old male with notable right ankle injury consistent with a grade 1-2 lateral ankle sprain.  Further discussion was had with the patient and he will implement immobilization with equalizer boot and he will begin to slowly wean out of this over the next 2 weeks as pain and swelling are his guide.  NSAIDs as needed.  No direct surgical indication.          This document was signed by Cruz Romeo PA-C February 17, 2025    CC: Mary Kearney APRN      Dictated Utilizing Dragon Dictation:   Please note that portions of this note were completed with a voice recognition program.   Part of this note may be an electronic transcription/translation of spoken language to printed text using the Dragon Dictation System.

## 2025-02-26 ENCOUNTER — TELEPHONE (OUTPATIENT)
Dept: ORTHOPEDIC SURGERY | Facility: CLINIC | Age: 40
End: 2025-02-26
Payer: COMMERCIAL

## 2025-02-26 NOTE — TELEPHONE ENCOUNTER
Hub staff attempted to follow warm transfer process and was unsuccessful     Caller: IRINA    Relationship to patient: SELF    Best call back number: [phone]    Patient is needing: NEEDS TO RESCHEDULE 3/3/25 APPT- PLEASE CALL

## 2025-03-07 ENCOUNTER — OFFICE VISIT (OUTPATIENT)
Dept: ORTHOPEDIC SURGERY | Facility: CLINIC | Age: 40
End: 2025-03-07
Payer: COMMERCIAL

## 2025-03-07 VITALS — HEIGHT: 70 IN | BODY MASS INDEX: 43.95 KG/M2 | WEIGHT: 307 LBS

## 2025-03-07 DIAGNOSIS — S93.401D MODERATE RIGHT ANKLE SPRAIN, SUBSEQUENT ENCOUNTER: Primary | ICD-10-CM

## 2025-03-07 NOTE — PROGRESS NOTES
[facility] Orthopaedic Surgery Established Patient Visit        Patient: Grzegorz Gutierrez  YOB: 1985  Date of Encounter: 3/7/2025  PCP: Mary Kearney APRN      Subjective     Chief Complaint   Patient presents with    Right Ankle - Follow-up, Pain           History of Present Illness:     Grzegorz Gutierrez is a 39 y.o. male presents today for follow-up new problem right ankle injury.  Patient reports that he was lifting and carrying furniture when he rolled his ankle off the edge of the blacktop.  The patient reported ankle inversion with difficulty upon attempted ambulation and range of motion.  The patient presented for radiographs and has been altering his immobilization as result.  The patient was placed in equalizer boot which she has been of some benefit.  Patient states that he is doing well following the immobilization with the equalizer boot.  He reports no other new complaints and states the pain and swelling is decreased.  No other new complaints.  Denies any paresthesias              Patient Active Problem List   Diagnosis    Palpitation    Snoring    Morbid obesity    REGINO (obstructive sleep apnea)    Status post vasectomy     Past Medical History:   Diagnosis Date    Disease of thyroid gland     GERD (gastroesophageal reflux disease)     Prediabetes     Sleep apnea      Past Surgical History:   Procedure Laterality Date    SKIN BIOPSY      VASECTOMY N/A 9/20/2024    Procedure: VASECTOMY;  Surgeon: Kilo Johnson MD;  Location: [facility];  Service: Urology;  Laterality: N/A;    WISDOM TOOTH EXTRACTION       Social History     Occupational History    Not on file   Tobacco Use    Smoking status: Never     Passive exposure: Never    Smokeless tobacco: Never   Vaping Use    Vaping status: Never Used   Substance and Sexual Activity    Alcohol use: Not Currently     Comment: rare    Drug use: Never    Sexual activity: Yes     Partners: Female    Grzegorz Gutierrez  reports that he  "has never smoked. He has never been exposed to tobacco smoke. He has never used smokeless tobacco.. I have educated him on the risk of diseases from using tobacco products such as cancer, COPD, and heart disease.        Social History     Social History Narrative    Caffeine:0-1  serving per day. None this week.     Family History   Problem Relation Age of Onset    Hypertension Mother     No Known Problems Father     No Known Problems Sister     No Known Problems Brother     Hypertension Maternal Grandmother     Stroke Maternal Grandmother     Heart attack Paternal Grandmother      Current Outpatient Medications   Medication Sig Dispense Refill    ibuprofen (ADVIL,MOTRIN) 800 MG tablet Take 1 tablet by mouth 3 (Three) Times a Day As Needed (pain). 60 tablet 0    levothyroxine (SYNTHROID, LEVOTHROID) 25 MCG tablet Take 2 tablets by mouth Daily.      Testosterone Cypionate (DEPOTESTOTERONE CYPIONATE) 200 MG/ML injection       Tirzepatide-Weight Management (ZEPBOUND) 2.5 MG/0.5ML solution Inject 0.5 mL under the skin into the appropriate area as directed 1 (One) Time Per Week.      vitamin D (ERGOCALCIFEROL) 1.25 MG (95148 UT) capsule capsule Take 1 capsule by mouth Every 7 (Seven) Days.       No current facility-administered medications for this visit.     No Known Allergies         Review of Systems   Constitutional: Negative.   HENT: Negative.     Eyes: Negative.    Cardiovascular: Negative.    Respiratory: Negative.          Sleep apnea   Endocrine: Negative.    Hematologic/Lymphatic: Negative.    Skin: Negative.    Musculoskeletal:         Pertinent positives listed in HPI   Gastrointestinal: Negative.    Genitourinary: Negative.    Neurological: Negative.    Psychiatric/Behavioral: Negative.     Allergic/Immunologic: Negative.          Objective      Vitals:    03/07/25 1028   Weight: (!) 139 kg (307 lb)   Height: 177.8 cm (70\")        Class 3 Severe Obesity (BMI >=40). Obesity-related health conditions include " the following:  listed in PMH . Obesity is newly identified. BMI is is above average; BMI management plan is completed. We discussed portion control and increasing exercise.      Physical Exam  Vitals and nursing note reviewed.   Constitutional:       General: He is not in acute distress.     Appearance: Normal appearance. He is not ill-appearing.   HENT:      Head: Normocephalic and atraumatic.      Right Ear: External ear normal.      Left Ear: External ear normal.      Nose: Nose normal.      Mouth/Throat:      Mouth: Mucous membranes are moist.      Pharynx: Oropharynx is clear.   Eyes:      Extraocular Movements: Extraocular movements intact.      Conjunctiva/sclera: Conjunctivae normal.      Pupils: Pupils are equal, round, and reactive to light.   Cardiovascular:      Rate and Rhythm: Normal rate.      Pulses: Normal pulses.   Pulmonary:      Effort: Pulmonary effort is normal.   Abdominal:      General: There is no distension.   Musculoskeletal:      Cervical back: Normal range of motion. No rigidity.      Comments: Examination today of patient's right ankle reveals there is tenderness to palpation along the lateral ligament complex most notably the ATFL.  The patient has noted full flexion and extension based on pain and symptoms.  Patient has difficulty upon ankle inversion.  Subtalar joint motion intact.  Neurovascular status grossly intact       Skin:     General: Skin is warm and dry.      Capillary Refill: Capillary refill takes less than 2 seconds.   Neurological:      General: No focal deficit present.      Mental Status: He is alert and oriented to person, place, and time.   Psychiatric:         Mood and Affect: Mood normal.         Behavior: Behavior normal.             Radiology:      XR Ankle 3+ View Right    Result Date: 2/15/2025  No fracture. Generalized soft tissue swelling.     [id number], Zip code [address].   This report was finalized on 2/15/2025 10:59 AM by Dr. Maria Elena John MD.      XR Foot  3+ View Right    Result Date: 2/15/2025  No fracture. Generalized soft tissue swelling.     [id number], Zip code [id number].   This report was finalized on 2/15/2025 10:59 AM by Dr. Maria Elena John MD.             Assessment/Plan      No diagnosis found.      39-year-old male with notable right ankle injury consistent with a grade 1-2 lateral ankle sprain.  He is having improvement of pain and symptoms and as result of this the patient was progressed from the boot into an ankle lace up brace to which she can wear over the course of the next 2 weeks.  He will slowly begin to implement range of motion and weightbearing and ankle proprioception and will return back on an as-needed basis upon any further complication or worsening of pain/symptoms.  No direct surgical negation          This document was signed by Cruz Romeo PA-C March 7, 2025    CC: Mary Kearney APRN      Dictated Utilizing Dragon Dictation:   Please note that portions of this note were completed with a voice recognition program.   Part of this note may be an electronic transcription/translation of spoken language to printed text using the Dragon Dictation System.

## 2025-04-25 ENCOUNTER — TRANSCRIBE ORDERS (OUTPATIENT)
Dept: ADMINISTRATIVE | Facility: HOSPITAL | Age: 40
End: 2025-04-25
Payer: COMMERCIAL

## 2025-04-25 ENCOUNTER — LAB (OUTPATIENT)
Dept: LAB | Facility: HOSPITAL | Age: 40
End: 2025-04-25
Payer: COMMERCIAL

## 2025-04-25 DIAGNOSIS — E66.01 MORBID OBESITY: ICD-10-CM

## 2025-04-25 DIAGNOSIS — G47.33 OBSTRUCTIVE SLEEP APNEA (ADULT) (PEDIATRIC): ICD-10-CM

## 2025-04-25 DIAGNOSIS — E29.0 TESTICULAR HYPERFUNCTION: ICD-10-CM

## 2025-04-25 DIAGNOSIS — E55.9 VITAMIN D DEFICIENCY DISEASE: ICD-10-CM

## 2025-04-25 DIAGNOSIS — E29.0 TESTICULAR HYPERFUNCTION: Primary | ICD-10-CM

## 2025-04-25 LAB
BASOPHILS # BLD AUTO: 0.03 10*3/MM3 (ref 0–0.2)
BASOPHILS NFR BLD AUTO: 0.4 % (ref 0–1.5)
DEPRECATED RDW RBC AUTO: 41.1 FL (ref 37–54)
EOSINOPHIL # BLD AUTO: 0.31 10*3/MM3 (ref 0–0.4)
EOSINOPHIL NFR BLD AUTO: 3.8 % (ref 0.3–6.2)
ERYTHROCYTE [DISTWIDTH] IN BLOOD BY AUTOMATED COUNT: 14.1 % (ref 12.3–15.4)
HCT VFR BLD AUTO: 48.6 % (ref 37.5–51)
HGB BLD-MCNC: 16 G/DL (ref 13–17.7)
IMM GRANULOCYTES # BLD AUTO: 0.02 10*3/MM3 (ref 0–0.05)
IMM GRANULOCYTES NFR BLD AUTO: 0.2 % (ref 0–0.5)
LYMPHOCYTES # BLD AUTO: 2.22 10*3/MM3 (ref 0.7–3.1)
LYMPHOCYTES NFR BLD AUTO: 27.5 % (ref 19.6–45.3)
MCH RBC QN AUTO: 26.8 PG (ref 26.6–33)
MCHC RBC AUTO-ENTMCNC: 32.9 G/DL (ref 31.5–35.7)
MCV RBC AUTO: 81.4 FL (ref 79–97)
MONOCYTES # BLD AUTO: 0.57 10*3/MM3 (ref 0.1–0.9)
MONOCYTES NFR BLD AUTO: 7.1 % (ref 5–12)
NEUTROPHILS NFR BLD AUTO: 4.92 10*3/MM3 (ref 1.7–7)
NEUTROPHILS NFR BLD AUTO: 61 % (ref 42.7–76)
NRBC BLD AUTO-RTO: 0 /100 WBC (ref 0–0.2)
PLATELET # BLD AUTO: 288 10*3/MM3 (ref 140–450)
PMV BLD AUTO: 10.4 FL (ref 6–12)
RBC # BLD AUTO: 5.97 10*6/MM3 (ref 4.14–5.8)
WBC NRBC COR # BLD AUTO: 8.07 10*3/MM3 (ref 3.4–10.8)

## 2025-04-25 PROCEDURE — 84402 ASSAY OF FREE TESTOSTERONE: CPT

## 2025-04-25 PROCEDURE — 84403 ASSAY OF TOTAL TESTOSTERONE: CPT

## 2025-04-25 PROCEDURE — 36415 COLL VENOUS BLD VENIPUNCTURE: CPT

## 2025-04-25 PROCEDURE — 85025 COMPLETE CBC W/AUTO DIFF WBC: CPT

## 2025-04-28 LAB
TESTOST FREE SERPL-MCNC: 16.8 PG/ML (ref 6.8–21.5)
TESTOST SERPL-MCNC: 634 NG/DL (ref 264–916)

## 2025-08-29 ENCOUNTER — OFFICE VISIT (OUTPATIENT)
Dept: SLEEP MEDICINE | Age: 40
End: 2025-08-29
Payer: COMMERCIAL

## 2025-08-29 VITALS
SYSTOLIC BLOOD PRESSURE: 140 MMHG | OXYGEN SATURATION: 97 % | HEIGHT: 70 IN | DIASTOLIC BLOOD PRESSURE: 70 MMHG | TEMPERATURE: 97.1 F | BODY MASS INDEX: 44.97 KG/M2 | HEART RATE: 71 BPM | WEIGHT: 314.1 LBS

## 2025-08-29 DIAGNOSIS — G47.33 OBSTRUCTIVE SLEEP APNEA, ADULT: Primary | ICD-10-CM

## 2025-08-29 DIAGNOSIS — E66.01 MORBID (SEVERE) OBESITY DUE TO EXCESS CALORIES: ICD-10-CM

## 2025-08-29 DIAGNOSIS — G47.19 EXCESSIVE DAYTIME SLEEPINESS: ICD-10-CM

## 2025-08-29 DIAGNOSIS — R06.83 SNORING: ICD-10-CM

## (undated) DEVICE — ELECTRD NDL EZ CLN MOD 2.75IN

## (undated) DEVICE — GLV SURG PREMIERPRO MIC LTX PF SZ8 BRN

## (undated) DEVICE — SPNG GZ WOVN 4X4IN 12PLY 10/BX STRL

## (undated) DEVICE — SUT GUT CHRM 3/0 SH 27IN G122H

## (undated) DEVICE — HYPODERMIC SAFETY NEEDLE: Brand: MONOJECT

## (undated) DEVICE — PK BASIC 70

## (undated) DEVICE — ADHS SKIN PREMIERPRO EXOFIN TOPICAL HI/VISC .5ML

## (undated) DEVICE — SKIN PREP TRAY 4 COMPARTM TRAY: Brand: MEDLINE INDUSTRIES, INC.

## (undated) DEVICE — PENCL ES MEGADINE EZ/CLEAN BUTN W/HOLSTR 10FT

## (undated) DEVICE — GOWN,REINF,POLY,ECL,PP SLV,XXL: Brand: MEDLINE

## (undated) DEVICE — DRAPE,LAPAROTOMY,PED,STERILE: Brand: MEDLINE

## (undated) DEVICE — HOLDER: Brand: DEROYAL